# Patient Record
Sex: MALE | Race: WHITE | URBAN - METROPOLITAN AREA
[De-identification: names, ages, dates, MRNs, and addresses within clinical notes are randomized per-mention and may not be internally consistent; named-entity substitution may affect disease eponyms.]

---

## 2023-01-30 ENCOUNTER — OFFICE VISIT (OUTPATIENT)
Dept: INTERNAL MEDICINE CLINIC | Age: 43
End: 2023-01-30
Payer: COMMERCIAL

## 2023-01-30 VITALS
BODY MASS INDEX: 23.35 KG/M2 | DIASTOLIC BLOOD PRESSURE: 85 MMHG | WEIGHT: 176.2 LBS | TEMPERATURE: 98.9 F | OXYGEN SATURATION: 99 % | HEIGHT: 73 IN | SYSTOLIC BLOOD PRESSURE: 129 MMHG | HEART RATE: 71 BPM | RESPIRATION RATE: 18 BRPM

## 2023-01-30 DIAGNOSIS — Z87.898 HISTORY OF ALCOHOL USE: ICD-10-CM

## 2023-01-30 DIAGNOSIS — Z13.220 SCREENING FOR HYPERLIPIDEMIA: ICD-10-CM

## 2023-01-30 DIAGNOSIS — R68.82 LOW LIBIDO: ICD-10-CM

## 2023-01-30 DIAGNOSIS — F31.9 BIPOLAR DEPRESSION (HCC): ICD-10-CM

## 2023-01-30 DIAGNOSIS — Z76.89 ENCOUNTER TO ESTABLISH CARE: Primary | ICD-10-CM

## 2023-01-30 DIAGNOSIS — R53.83 FATIGUE, UNSPECIFIED TYPE: ICD-10-CM

## 2023-01-30 DIAGNOSIS — Z00.00 PHYSICAL EXAM, ANNUAL: ICD-10-CM

## 2023-01-30 PROCEDURE — 99203 OFFICE O/P NEW LOW 30 MIN: CPT | Performed by: INTERNAL MEDICINE

## 2023-01-30 RX ORDER — LAMOTRIGINE 200 MG/1
200 TABLET ORAL DAILY
COMMUNITY

## 2023-01-30 RX ORDER — HYDROXYZINE 50 MG/1
50 TABLET, FILM COATED ORAL AS NEEDED
COMMUNITY

## 2023-01-30 RX ORDER — QUETIAPINE FUMARATE 100 MG/1
100 TABLET, FILM COATED ORAL
COMMUNITY

## 2023-01-30 NOTE — PROGRESS NOTES
Chief Complaint   Patient presents with    Establish Care     Pt states he has been very tired and has been cold. He states his sex drive has gone down          Vitals:    01/30/23 1316   BP: 129/85   Pulse: 71   Resp: 18   Temp: 98.9 °F (37.2 °C)   TempSrc: Temporal   SpO2: 99%   Weight: 176 lb 3.2 oz (79.9 kg)   Height: 6' 1\" (1.854 m)   PainSc:   2   PainLoc: Foot       Current Outpatient Medications on File Prior to Visit   Medication Sig Dispense Refill    lurasidone (Latuda) 80 mg tab tablet Take 80 mg by mouth daily (with dinner). QUEtiapine (SEROqueL) 100 mg tablet Take 100 mg by mouth nightly. lamoTRIgine (LaMICtal) 200 mg tablet Take 200 mg by mouth daily. hydrOXYzine HCL (ATARAX) 50 mg tablet Take 50 mg by mouth as needed. No current facility-administered medications on file prior to visit. Health Maintenance Due   Topic    Hepatitis C Screening     Depression Screen     COVID-19 Vaccine (1)    Hepatitis B Vaccine (1 of 3 - Risk 3-dose series)    DTaP/Tdap/Td series (1 - Tdap)    Lipid Screen     Flu Vaccine (1)    Colorectal Cancer Screening Combo        1. \"Have you been to the ER, urgent care clinic since your last visit? Hospitalized since your last visit? \" No    2. \"Have you seen or consulted any other health care providers outside of the [address] [place] [address] since your last visit? \" Yes DR Rama Gregorio      3. For patients aged 39-70: Has the patient had a colonoscopy / FIT/ Cologuard? Yes - no Care Gap present,Dr Jennifer Li [facility]      If the patient is female:    4. For patients aged 41-77: Has the patient had a mammogram within the past 2 years? NA - based on age or sex      11. For patients aged 21-65: Has the patient had a pap smear?  NA - based on age or sex

## 2023-01-30 NOTE — PROGRESS NOTES
Akua Brown is a 37 y.o. male  Chief Complaint   Patient presents with    [facility] Care     Pt states he has been very tired and has been cold. He states his sex drive has gone down     Visit Vitals  /85 (BP 1 Location: Right upper arm, BP Patient Position: Sitting, BP Cuff Size: Adult long)   Pulse 71   Temp 98.9 °F (37.2 °C) (Temporal)   Resp 18   Ht 6' 1\" (1.854 m)   Wt 176 lb 3.2 oz (79.9 kg)   SpO2 99%   BMI 23.25 kg/m²          HPI  Mr.Scott Johnathon Simpson presents to clinic to [facility]. He has a history of significant alcohol use now sober for a year and his main concern today is low libido, weakness and fatigue over the last one year. He denies fever, chills. He is otherwise in good health, no concern for depression or STD. Denies chest pain, shortness of breath, melena or hematochezia. Diet: healthy balanced diet   Exercise: walking  regularly     Health maintenance: didn't get covid vaccine because he had the COVID virus infection. .  Past Medical History:   Diagnosis Date    Bipolar II disorder, hypomanic episode, with seasonal pattern ([address]     dx 1 year ago            ROS  Review of Systems   Constitutional:  Negative for chills and fever. HENT:  Negative for hearing loss and tinnitus. Respiratory:  Negative for cough, hemoptysis, sputum production and shortness of breath. Cardiovascular:  Negative for chest pain and palpitations. Gastrointestinal:  Negative for abdominal pain, heartburn and nausea. Genitourinary:  Negative for dysuria and urgency. Musculoskeletal:  Negative for myalgias and neck pain. Neurological:  Negative for dizziness, tingling and headaches. Psychiatric/Behavioral:  Negative for depression and suicidal ideas. EXAM  Physical Exam  Constitutional:       General: He is not in acute distress. Appearance: Normal appearance. He is not toxic-appearing. HENT:      Head: Normocephalic and atraumatic.       Nose: No congestion or rhinorrhea. Eyes:      General:         Right eye: No discharge. Extraocular Movements: Extraocular movements intact. Cardiovascular:      Rate and Rhythm: Normal rate and regular rhythm. Heart sounds: Normal heart sounds. No murmur heard. No gallop. Pulmonary:      Effort: Pulmonary effort is normal. No respiratory distress. Breath sounds: Normal breath sounds. No wheezing or rales. Abdominal:      General: There is no distension. Palpations: Abdomen is soft. There is no mass. Tenderness: There is no abdominal tenderness. There is no right CVA tenderness, left CVA tenderness, guarding or rebound. Hernia: No hernia is present. Musculoskeletal:         General: No swelling or deformity. Right lower leg: No edema. Skin:     Coloration: Skin is not jaundiced. Findings: No bruising or lesion. Neurological:      General: No focal deficit present. Mental Status: He is alert and oriented to person, place, and time. Motor: No weakness. Psychiatric:         Mood and Affect: Mood normal.       Health Maintenance Due   Topic Date Due    COVID-19 Vaccine (1) Never done    Hepatitis B Vaccine (1 of 3 - Risk 3-dose series) Never done    DTaP/Tdap/Td series (1 - Tdap) Never done    Lipid Screen  Never done    Flu Vaccine (1) Never done    Colorectal Cancer Screening Combo  01/21/2023       ASSESSMENT/PLAN    Diagnoses and all orders for this visit:    1. Encounter to establish care    2. Physical exam, annual  -     CBC WITH AUTOMATED DIFF; Future  -     METABOLIC PANEL, COMPREHENSIVE; Future  -     VITAMIN B1, WHOLE BLOOD; Future  -     VITAMIN B12 & FOLATE; Future  -     LIPID PANEL; Future  -     VITAMIN D, 25 HYDROXY; Future    3. Fatigue, unspecified type  -     THYROID CASCADE PROFILE; Future  -     CBC WITH AUTOMATED DIFF; Future  -     VITAMIN B1, WHOLE BLOOD; Future  -     VITAMIN B12 & FOLATE;  Future  -     VITAMIN D, 25 HYDROXY; Future  - TESTOSTERONE, FREE & TOTAL    4. Low libido  -     THYROID CASCADE PROFILE; Future  -     CBC WITH AUTOMATED DIFF; Future  -     VITAMIN B1, WHOLE BLOOD; Future  -     VITAMIN B12 & FOLATE; Future  -     VITAMIN D, 25 HYDROXY; Future  -     TESTOSTERONE, FREE & TOTAL    5. History of alcohol use  -     VITAMIN B12 & FOLATE; Future    6. Screening for hyperlipidemia  -     LIPID PANEL; Future    7.  Bipolar depression ([facility]  Comments:  f/u with psychiatry         Yeimi Aceves MD

## 2023-01-31 PROBLEM — Z87.898 HISTORY OF ALCOHOL USE: Status: ACTIVE | Noted: 2023-01-31

## 2023-01-31 PROBLEM — F31.9 BIPOLAR DEPRESSION (HCC): Status: ACTIVE | Noted: 2023-01-31

## 2023-05-12 SDOH — ECONOMIC STABILITY: FOOD INSECURITY: WITHIN THE PAST 12 MONTHS, THE FOOD YOU BOUGHT JUST DIDN'T LAST AND YOU DIDN'T HAVE MONEY TO GET MORE.: NEVER TRUE

## 2023-05-12 SDOH — ECONOMIC STABILITY: TRANSPORTATION INSECURITY
IN THE PAST 12 MONTHS, HAS LACK OF TRANSPORTATION KEPT YOU FROM MEETINGS, WORK, OR FROM GETTING THINGS NEEDED FOR DAILY LIVING?: NO

## 2023-05-12 SDOH — ECONOMIC STABILITY: FOOD INSECURITY: WITHIN THE PAST 12 MONTHS, YOU WORRIED THAT YOUR FOOD WOULD RUN OUT BEFORE YOU GOT MONEY TO BUY MORE.: NEVER TRUE

## 2023-05-12 SDOH — ECONOMIC STABILITY: INCOME INSECURITY: HOW HARD IS IT FOR YOU TO PAY FOR THE VERY BASICS LIKE FOOD, HOUSING, MEDICAL CARE, AND HEATING?: NOT VERY HARD

## 2023-05-12 SDOH — ECONOMIC STABILITY: HOUSING INSECURITY
IN THE LAST 12 MONTHS, WAS THERE A TIME WHEN YOU DID NOT HAVE A STEADY PLACE TO SLEEP OR SLEPT IN A SHELTER (INCLUDING NOW)?: NO

## 2023-05-15 ENCOUNTER — OFFICE VISIT (OUTPATIENT)
Age: 43
End: 2023-05-15
Payer: MEDICAID

## 2023-05-15 VITALS
SYSTOLIC BLOOD PRESSURE: 113 MMHG | RESPIRATION RATE: 18 BRPM | HEIGHT: 73 IN | TEMPERATURE: 97.9 F | WEIGHT: 176.2 LBS | OXYGEN SATURATION: 97 % | HEART RATE: 92 BPM | DIASTOLIC BLOOD PRESSURE: 83 MMHG | BODY MASS INDEX: 23.35 KG/M2

## 2023-05-15 DIAGNOSIS — E03.8 SUBCLINICAL HYPOTHYROIDISM: Primary | ICD-10-CM

## 2023-05-15 DIAGNOSIS — E03.8 SUBCLINICAL HYPOTHYROIDISM: ICD-10-CM

## 2023-05-15 PROCEDURE — 99213 OFFICE O/P EST LOW 20 MIN: CPT | Performed by: INTERNAL MEDICINE

## 2023-05-15 RX ORDER — QUETIAPINE FUMARATE 200 MG/1
TABLET, FILM COATED ORAL
COMMUNITY
Start: 2023-04-19

## 2023-05-15 ASSESSMENT — ENCOUNTER SYMPTOMS
SHORTNESS OF BREATH: 0
COUGH: 0

## 2023-05-15 NOTE — PROGRESS NOTES
Jorge Felipe is a 37 y.o. male  Chief Complaint   Patient presents with    Discuss Labs       Vitals:    05/15/23 0929   BP: 113/83   Pulse: 92   Resp: 18   Temp: 97.9 °F (36.6 °C)   SpO2: 97%          HPI  Mr.Scott Rebekah Hicks presents to follow up on his subclinical hypothyroidism. He continues to have loss of libido and fatigue. After starting levothyroxine, his energy during the day has improved. He is concerned about sleep problems and goes to psychiatry for Bipolar disorder and doesn't feel depressed at this time. Past Medical History:   Diagnosis Date    Bipolar II disorder, hypomanic episode, with seasonal pattern ([address]     dx 1 year ago            ROS  Review of Systems   Constitutional:  Positive for fatigue. Negative for fever. HENT:  Negative for congestion and dental problem. Respiratory:  Negative for cough and shortness of breath. Cardiovascular:  Negative for chest pain and palpitations. Endocrine: Negative for cold intolerance and heat intolerance. Genitourinary:  Negative for difficulty urinating. Neurological:  Negative for headaches. Psychiatric/Behavioral:  Negative for agitation and dysphoric mood. EXAM  Physical Exam  Vitals and nursing note reviewed. Cardiovascular:      Rate and Rhythm: Normal rate and regular rhythm. Pulses: Normal pulses. Heart sounds: Normal heart sounds. No murmur heard. Pulmonary:      Effort: Pulmonary effort is normal. No respiratory distress. Breath sounds: Normal breath sounds. Psychiatric:         Mood and Affect: Mood normal.         Behavior: Behavior normal.         Thought Content: Thought content normal.         Judgment: Judgment normal.       Health Maintenance Due   Topic Date Due    COVID-19 Vaccine (1) Never done    HIV screen  Never done    DTaP/Tdap/Td vaccine (1 - Tdap) Never done    Colorectal Cancer Screen  Never done       ASSESSMENT/PLAN    Eanelvis Quan was seen today for discuss labs.     Diagnoses and all

## 2023-05-15 NOTE — PROGRESS NOTES
Rm    Chief Complaint   Patient presents with    Discuss Labs        /83 (Site: Right Upper Arm, Position: Sitting, Cuff Size: Medium Adult)   Pulse 92   Temp 97.9 °F (36.6 °C) (Temporal)   Resp 18   Ht 6' 1\" (1.854 m)   Wt 176 lb 3.2 oz (79.9 kg)   SpO2 97%   BMI 23.25 kg/m²      1. Have you been to the ER, urgent care clinic since your last visit? Hospitalized since your last visit? no    2. Have you seen or consulted any other health care providers outside of the [address] [place] [address] since your last visit? Include any pap smears or colon screening. no    Health Maintenance Due   Topic Date Due    COVID-19 Vaccine (1) Never done    HIV screen  Never done    DTaP/Tdap/Td vaccine (1 - Tdap) Never done    Colorectal Cancer Screen  Never done        No flowsheet data found. No flowsheet data found. No flowsheet data found.

## 2023-05-19 LAB
T3 SERPL-MCNC: 111 NG/DL (ref 71–180)
T4 FREE SERPL-MCNC: 1.21 NG/DL (ref 0.82–1.77)
TSH SERPL DL<=0.005 MIU/L-ACNC: 4.76 UIU/ML (ref 0.45–4.5)

## 2023-05-25 ENCOUNTER — PATIENT MESSAGE (OUTPATIENT)
Age: 43
End: 2023-05-25

## 2023-05-26 RX ORDER — LEVOTHYROXINE SODIUM 0.03 MG/1
37.5 TABLET ORAL DAILY
Qty: 135 TABLET | Refills: 0 | Status: SHIPPED | OUTPATIENT
Start: 2023-05-26 | End: 2023-08-24

## 2023-09-14 RX ORDER — LEVOTHYROXINE SODIUM 0.03 MG/1
37.5 TABLET ORAL DAILY
Qty: 135 TABLET | Refills: 0 | Status: SHIPPED | OUTPATIENT
Start: 2023-09-14 | End: 2023-12-13

## 2023-10-20 ENCOUNTER — OFFICE VISIT (OUTPATIENT)
Age: 43
End: 2023-10-20
Payer: MEDICAID

## 2023-10-20 VITALS
BODY MASS INDEX: 23.59 KG/M2 | TEMPERATURE: 98.6 F | OXYGEN SATURATION: 98 % | DIASTOLIC BLOOD PRESSURE: 82 MMHG | WEIGHT: 178 LBS | HEIGHT: 73 IN | HEART RATE: 76 BPM | RESPIRATION RATE: 16 BRPM | SYSTOLIC BLOOD PRESSURE: 128 MMHG

## 2023-10-20 DIAGNOSIS — Z00.00 ENCOUNTER FOR WELL ADULT EXAM WITHOUT ABNORMAL FINDINGS: Primary | ICD-10-CM

## 2023-10-20 DIAGNOSIS — E03.8 SUBCLINICAL HYPOTHYROIDISM: ICD-10-CM

## 2023-10-20 DIAGNOSIS — Z12.11 SCREEN FOR COLON CANCER: ICD-10-CM

## 2023-10-20 PROCEDURE — 99396 PREV VISIT EST AGE 40-64: CPT | Performed by: INTERNAL MEDICINE

## 2023-10-20 RX ORDER — LURASIDONE HYDROCHLORIDE 20 MG/1
20 TABLET, FILM COATED ORAL
COMMUNITY

## 2023-10-20 ASSESSMENT — PATIENT HEALTH QUESTIONNAIRE - PHQ9
SUM OF ALL RESPONSES TO PHQ QUESTIONS 1-9: 0
SUM OF ALL RESPONSES TO PHQ9 QUESTIONS 1 & 2: 0
2. FEELING DOWN, DEPRESSED OR HOPELESS: 0
SUM OF ALL RESPONSES TO PHQ QUESTIONS 1-9: 0
1. LITTLE INTEREST OR PLEASURE IN DOING THINGS: 0

## 2023-10-23 ASSESSMENT — ENCOUNTER SYMPTOMS
VOMITING: 0
COUGH: 0
DIARRHEA: 0
SORE THROAT: 0
SINUS PRESSURE: 0
EYE PAIN: 0
CONSTIPATION: 0
NAUSEA: 0
ABDOMINAL PAIN: 0
BACK PAIN: 0
SHORTNESS OF BREATH: 0
BLOOD IN STOOL: 0
SINUS PAIN: 0

## 2023-11-04 LAB — NONINV COLON CA DNA+OCC BLD SCRN STL QL: NEGATIVE

## 2023-12-13 RX ORDER — LEVOTHYROXINE SODIUM 0.03 MG/1
37.5 TABLET ORAL DAILY
Qty: 135 TABLET | Refills: 0 | Status: SHIPPED | OUTPATIENT
Start: 2023-12-13 | End: 2024-03-12

## 2024-04-12 RX ORDER — LEVOTHYROXINE SODIUM 0.03 MG/1
37.5 TABLET ORAL DAILY
Qty: 135 TABLET | Refills: 0 | Status: SHIPPED | OUTPATIENT
Start: 2024-04-12 | End: 2024-07-11

## 2024-04-12 NOTE — TELEPHONE ENCOUNTER
Refill request received from [facility] for   Requested Prescriptions     Pending Prescriptions Disp Refills    levothyroxine (SYNTHROID) 25 MCG tablet 45 tablet 0     Sig: Take 1.5 tablets by mouth daily     Last office visit: 10/20/2023   Next office visit: Visit date not found     Routed to Dr Sada Tran for review.

## 2024-07-29 RX ORDER — LEVOTHYROXINE SODIUM 0.03 MG/1
37.5 TABLET ORAL DAILY
Qty: 135 TABLET | Refills: 0 | Status: SHIPPED | OUTPATIENT
Start: 2024-07-29 | End: 2024-10-27

## 2024-10-21 RX ORDER — LEVOTHYROXINE SODIUM 25 UG/1
37.5 TABLET ORAL DAILY
Qty: 135 TABLET | Refills: 0 | OUTPATIENT
Start: 2024-10-21 | End: 2025-01-19

## 2024-11-04 ENCOUNTER — OFFICE VISIT (OUTPATIENT)
Age: 44
End: 2024-11-04
Payer: COMMERCIAL

## 2024-11-04 VITALS
HEIGHT: 71 IN | HEART RATE: 62 BPM | WEIGHT: 172 LBS | TEMPERATURE: 98.2 F | OXYGEN SATURATION: 98 % | BODY MASS INDEX: 24.08 KG/M2 | DIASTOLIC BLOOD PRESSURE: 75 MMHG | SYSTOLIC BLOOD PRESSURE: 130 MMHG | RESPIRATION RATE: 18 BRPM

## 2024-11-04 DIAGNOSIS — E03.8 SUBCLINICAL HYPOTHYROIDISM: ICD-10-CM

## 2024-11-04 DIAGNOSIS — Z00.00 ENCOUNTER FOR WELL ADULT EXAM WITHOUT ABNORMAL FINDINGS: Primary | ICD-10-CM

## 2024-11-04 DIAGNOSIS — E78.00 PURE HYPERCHOLESTEROLEMIA: ICD-10-CM

## 2024-11-04 PROCEDURE — 99396 PREV VISIT EST AGE 40-64: CPT | Performed by: INTERNAL MEDICINE

## 2024-11-04 RX ORDER — LEVOTHYROXINE SODIUM 25 UG/1
37.5 TABLET ORAL DAILY
Qty: 135 TABLET | Refills: 0 | Status: SHIPPED | OUTPATIENT
Start: 2024-11-04 | End: 2024-11-04

## 2024-11-04 RX ORDER — LEVOTHYROXINE SODIUM 25 UG/1
37.5 TABLET ORAL DAILY
Qty: 135 TABLET | Refills: 0 | Status: SHIPPED | OUTPATIENT
Start: 2024-11-04 | End: 2025-02-02

## 2024-11-04 SDOH — ECONOMIC STABILITY: FOOD INSECURITY: WITHIN THE PAST 12 MONTHS, YOU WORRIED THAT YOUR FOOD WOULD RUN OUT BEFORE YOU GOT MONEY TO BUY MORE.: NEVER TRUE

## 2024-11-04 SDOH — ECONOMIC STABILITY: FOOD INSECURITY: WITHIN THE PAST 12 MONTHS, THE FOOD YOU BOUGHT JUST DIDN'T LAST AND YOU DIDN'T HAVE MONEY TO GET MORE.: NEVER TRUE

## 2024-11-04 SDOH — ECONOMIC STABILITY: INCOME INSECURITY: HOW HARD IS IT FOR YOU TO PAY FOR THE VERY BASICS LIKE FOOD, HOUSING, MEDICAL CARE, AND HEATING?: NOT HARD AT ALL

## 2024-11-04 ASSESSMENT — PATIENT HEALTH QUESTIONNAIRE - PHQ9

## 2024-11-04 ASSESSMENT — ENCOUNTER SYMPTOMS
COUGH: 0
SHORTNESS OF BREATH: 0

## 2024-11-04 NOTE — PROGRESS NOTES
I have reviewed all needed documentation in preparation for visit. Verified patient by name and date of birth  Chief Complaint   Patient presents with    Annual Exam       Vitals:    11/04/24 0822   BP: 130/75   Site: Left Upper Arm   Position: Sitting   Cuff Size: Large Adult   Pulse: 62   Resp: 18   Temp: 98.2 °F (36.8 °C)   TempSrc: Temporal   SpO2: 98%   Weight: 78 kg (172 lb)  Comment: without shoes   Height: 1.803 m (5' 11\")  Comment: w/o shoes       Health Maintenance Due   Topic Date Due    Hepatitis B vaccine (1 of 3 - 19+ 3-dose series) Never done    DTaP/Tdap/Td vaccine (1 - Tdap) Never done    Flu vaccine (1) Never done    COVID-19 Vaccine (1 - 2023-24 season) Never done    Depression Monitoring  10/20/2024     \"Have you been to the ER, urgent care clinic since your last visit?  Hospitalized since your last visit?\"    NO    “Have you seen or consulted any other health care providers outside of [facility] since your last visit?”    NO          Click Here for Release of Records Request         Miguel TOSCANO

## 2024-11-04 NOTE — PROGRESS NOTES
Well Adult Note  Name: Erik Sanchez Today’s Date: 2024   MRN: [id number] Sex: Male   Age: 44 y.o. Ethnicity: Non- / Non    : 1980 Race: White (non-)      Erik Sanchez is here for a well adult exam.       Subjective   History:  Mr.Scott Sanchez presents to the clinic for a physical examination with no acute concerns. He has been sober from alcohol for over two years and is currently tapering off his medication for bipolar depression, having completely discontinued it. He reports feeling great and has started working full time. Additionally, he began exercising two weeks ago, and his wife ensures he maintains a healthy diet that includes vegetables. He does not experience lower urinary tract symptoms, chest pain, shortness of breath, melena, or hematochezia, and he has no aches or pains. The patient had a colonoscopy approximately six years ago and a Cologuard test one year ago, both of which were normal      Review of Systems   Constitutional:  Negative for fever.   Respiratory:  Negative for cough and shortness of breath.    Cardiovascular:  Negative for chest pain and palpitations.   Neurological:  Negative for headaches.   Psychiatric/Behavioral:  Negative for dysphoric mood.        No Known Allergies  Prior to Visit Medications    Medication Sig Taking? Authorizing Provider   levothyroxine (SYNTHROID) 25 MCG tablet Take 1.5 tablets by mouth daily Yes Sada Tran MD     Past Medical History:   Diagnosis Date    Bipolar II disorder, hypomanic episode, with seasonal pattern (HCC)     dx 1 year ago    GERD (gastroesophageal reflux disease)     No longer an issue    Hypothyroidism     Substance abuse (HCC)     Sober since 2022     History reviewed. No pertinent surgical history.  Family History   Problem Relation Age of Onset    Hypertension Mother     Alcohol Abuse Mother     Depression Mother     High Blood Pressure Mother     Hypertension Father     High

## 2024-11-08 DIAGNOSIS — Z00.00 ENCOUNTER FOR WELL ADULT EXAM WITHOUT ABNORMAL FINDINGS: ICD-10-CM

## 2024-11-08 DIAGNOSIS — E03.8 SUBCLINICAL HYPOTHYROIDISM: ICD-10-CM

## 2024-11-08 DIAGNOSIS — E78.00 PURE HYPERCHOLESTEROLEMIA: ICD-10-CM

## 2024-11-08 LAB
ALBUMIN SERPL-MCNC: 4 G/DL (ref 3.5–5)
ALBUMIN/GLOB SERPL: 1.4 (ref 1.1–2.2)
ALP SERPL-CCNC: 56 U/L (ref 45–117)
ALT SERPL-CCNC: 23 U/L (ref 12–78)
ANION GAP SERPL CALC-SCNC: 1 MMOL/L (ref 2–12)
AST SERPL-CCNC: 9 U/L (ref 15–37)
BASOPHILS # BLD: 0 K/UL (ref 0–0.1)
BASOPHILS NFR BLD: 0 % (ref 0–1)
BILIRUB SERPL-MCNC: 0.7 MG/DL (ref 0.2–1)
BUN SERPL-MCNC: 13 MG/DL (ref 6–20)
BUN/CREAT SERPL: 14 (ref 12–20)
CALCIUM SERPL-MCNC: 9.1 MG/DL (ref 8.5–10.1)
CHLORIDE SERPL-SCNC: 107 MMOL/L (ref 97–108)
CHOLEST SERPL-MCNC: 212 MG/DL
CO2 SERPL-SCNC: 32 MMOL/L (ref 21–32)
CREAT SERPL-MCNC: 0.9 MG/DL (ref 0.7–1.3)
DIFFERENTIAL METHOD BLD: ABNORMAL
EOSINOPHIL # BLD: 0.1 K/UL (ref 0–0.4)
EOSINOPHIL NFR BLD: 1 % (ref 0–7)
ERYTHROCYTE [DISTWIDTH] IN BLOOD BY AUTOMATED COUNT: 12 % (ref 11.5–14.5)
EST. AVERAGE GLUCOSE BLD GHB EST-MCNC: 94 MG/DL
GLOBULIN SER CALC-MCNC: 2.9 G/DL (ref 2–4)
GLUCOSE SERPL-MCNC: 95 MG/DL (ref 65–100)
HBA1C MFR BLD: 4.9 % (ref 4–5.6)
HCT VFR BLD AUTO: 44.7 % (ref 36.6–50.3)
HDLC SERPL-MCNC: 44 MG/DL
HDLC SERPL: 4.8 (ref 0–5)
HGB BLD-MCNC: 15.1 G/DL (ref 12.1–17)
IMM GRANULOCYTES # BLD AUTO: 0 K/UL (ref 0–0.04)
IMM GRANULOCYTES NFR BLD AUTO: 0 % (ref 0–0.5)
LDLC SERPL CALC-MCNC: 145.6 MG/DL (ref 0–100)
LYMPHOCYTES # BLD: 1.2 K/UL (ref 0.8–3.5)
LYMPHOCYTES NFR BLD: 16 % (ref 12–49)
MCH RBC QN AUTO: 29.4 PG (ref 26–34)
MCHC RBC AUTO-ENTMCNC: 33.8 G/DL (ref 30–36.5)
MCV RBC AUTO: 87.1 FL (ref 80–99)
MONOCYTES # BLD: 0.5 K/UL (ref 0–1)
MONOCYTES NFR BLD: 6 % (ref 5–13)
NEUTS SEG # BLD: 5.6 K/UL (ref 1.8–8)
NEUTS SEG NFR BLD: 77 % (ref 32–75)
NRBC # BLD: 0 K/UL (ref 0–0.01)
NRBC BLD-RTO: 0 PER 100 WBC
PLATELET # BLD AUTO: 142 K/UL (ref 150–400)
PMV BLD AUTO: 9.9 FL (ref 8.9–12.9)
POTASSIUM SERPL-SCNC: 3.9 MMOL/L (ref 3.5–5.1)
PROT SERPL-MCNC: 6.9 G/DL (ref 6.4–8.2)
RBC # BLD AUTO: 5.13 M/UL (ref 4.1–5.7)
SODIUM SERPL-SCNC: 140 MMOL/L (ref 136–145)
T4 FREE SERPL-MCNC: 1.2 NG/DL (ref 0.8–1.5)
TRIGL SERPL-MCNC: 112 MG/DL
TSH SERPL DL<=0.05 MIU/L-ACNC: 2.47 UIU/ML (ref 0.36–3.74)
VLDLC SERPL CALC-MCNC: 22.4 MG/DL
WBC # BLD AUTO: 7.4 K/UL (ref 4.1–11.1)

## 2025-01-02 ENCOUNTER — OFFICE VISIT (OUTPATIENT)
Age: 45
End: 2025-01-02
Payer: COMMERCIAL

## 2025-01-02 VITALS
HEART RATE: 75 BPM | SYSTOLIC BLOOD PRESSURE: 113 MMHG | TEMPERATURE: 98.4 F | BODY MASS INDEX: 22.66 KG/M2 | WEIGHT: 171 LBS | RESPIRATION RATE: 18 BRPM | HEIGHT: 73 IN | DIASTOLIC BLOOD PRESSURE: 76 MMHG | OXYGEN SATURATION: 97 %

## 2025-01-02 DIAGNOSIS — J20.9 ACUTE BRONCHITIS, UNSPECIFIED ORGANISM: Primary | ICD-10-CM

## 2025-01-02 DIAGNOSIS — J01.00 ACUTE NON-RECURRENT MAXILLARY SINUSITIS: ICD-10-CM

## 2025-01-02 PROCEDURE — 99214 OFFICE O/P EST MOD 30 MIN: CPT | Performed by: INTERNAL MEDICINE

## 2025-01-02 RX ORDER — ALBUTEROL SULFATE 90 UG/1
2 INHALANT RESPIRATORY (INHALATION) EVERY 6 HOURS PRN
Qty: 1 EACH | Refills: 0 | Status: SHIPPED | OUTPATIENT
Start: 2025-01-02

## 2025-01-02 ASSESSMENT — PATIENT HEALTH QUESTIONNAIRE - PHQ9
1. LITTLE INTEREST OR PLEASURE IN DOING THINGS: NOT AT ALL
SUM OF ALL RESPONSES TO PHQ9 QUESTIONS 1 & 2: 0
5. POOR APPETITE OR OVEREATING: NOT AT ALL
4. FEELING TIRED OR HAVING LITTLE ENERGY: NOT AT ALL
3. TROUBLE FALLING OR STAYING ASLEEP: NOT AT ALL
SUM OF ALL RESPONSES TO PHQ QUESTIONS 1-9: 0
9. THOUGHTS THAT YOU WOULD BE BETTER OFF DEAD, OR OF HURTING YOURSELF: NOT AT ALL
10. IF YOU CHECKED OFF ANY PROBLEMS, HOW DIFFICULT HAVE THESE PROBLEMS MADE IT FOR YOU TO DO YOUR WORK, TAKE CARE OF THINGS AT HOME, OR GET ALONG WITH OTHER PEOPLE: NOT DIFFICULT AT ALL
7. TROUBLE CONCENTRATING ON THINGS, SUCH AS READING THE NEWSPAPER OR WATCHING TELEVISION: NOT AT ALL
6. FEELING BAD ABOUT YOURSELF - OR THAT YOU ARE A FAILURE OR HAVE LET YOURSELF OR YOUR FAMILY DOWN: NOT AT ALL
2. FEELING DOWN, DEPRESSED OR HOPELESS: NOT AT ALL
8. MOVING OR SPEAKING SO SLOWLY THAT OTHER PEOPLE COULD HAVE NOTICED. OR THE OPPOSITE, BEING SO FIGETY OR RESTLESS THAT YOU HAVE BEEN MOVING AROUND A LOT MORE THAN USUAL: NOT AT ALL
SUM OF ALL RESPONSES TO PHQ QUESTIONS 1-9: 0

## 2025-01-02 ASSESSMENT — ENCOUNTER SYMPTOMS
SINUS PAIN: 1
COUGH: 1
RHINORRHEA: 1
EYE PAIN: 0
SHORTNESS OF BREATH: 0
TROUBLE SWALLOWING: 0
SINUS PRESSURE: 1

## 2025-01-02 NOTE — PROGRESS NOTES
I have reviewed all needed documentation in preparation for visit. Verified patient by name and date of birth  Chief Complaint   Patient presents with    Cold Symptoms     Sxs Chest Congestion , Phlegm dark brown in color at night in the day time looks yellow . Started on Saturday        There were no vitals filed for this visit.    Health Maintenance Due   Topic Date Due    Hepatitis B vaccine (1 of 3 - 19+ 3-dose series) Never done    DTaP/Tdap/Td vaccine (1 - Tdap) Never done    Flu vaccine (1) Never done    COVID-19 Vaccine (1 - 2023-24 season) Never done     \"Have you been to the ER, urgent care clinic since your last visit?  Hospitalized since your last visit?\"    NO    “Have you seen or consulted any other health care providers outside of [facility] since your last visit?”    NO          Click Here for Release of Records Request         Miguel TOSCANO  
     Mood and Affect: Mood normal.         Behavior: Behavior normal.         Thought Content: Thought content normal.         Judgment: Judgment normal.         Health Maintenance Due   Topic Date Due    Hepatitis B vaccine (1 of 3 - 19+ 3-dose series) Never done    DTaP/Tdap/Td vaccine (1 - Tdap) Never done    Flu vaccine (1) Never done    COVID-19 Vaccine (1 - 2023-24 season) Never done       ASSESSMENT/PLAN    Erik was seen today for cold symptoms.    Diagnoses and all orders for this visit:    Acute bronchitis, unspecified organism  -     amoxicillin-clavulanate (AUGMENTIN) 875-125 MG per tablet; Take 1 tablet by mouth 2 times daily for 7 days  -     albuterol sulfate HFA (PROVENTIL;VENTOLIN;PROAIR) 108 (90 Base) MCG/ACT inhaler; Inhale 2 puffs into the lungs every 6 hours as needed for Shortness of Breath    Acute non-recurrent maxillary sinusitis  -     amoxicillin-clavulanate (AUGMENTIN) 875-125 MG per tablet; Take 1 tablet by mouth 2 times daily for 7 days  -     albuterol sulfate HFA (PROVENTIL;VENTOLIN;PROAIR) 108 (90 Base) MCG/ACT inhaler; Inhale 2 puffs into the lungs every 6 hours as needed for Shortness of Breath      Use saline irrigation   OTC cough medication for cough   Use albuterol inhaler   If symptoms doesn't feel better, return for further evaluation       Sada Tran MD

## 2025-01-12 SDOH — ECONOMIC STABILITY: INCOME INSECURITY: IN THE LAST 12 MONTHS, WAS THERE A TIME WHEN YOU WERE NOT ABLE TO PAY THE MORTGAGE OR RENT ON TIME?: NO

## 2025-01-12 SDOH — ECONOMIC STABILITY: FOOD INSECURITY: WITHIN THE PAST 12 MONTHS, YOU WORRIED THAT YOUR FOOD WOULD RUN OUT BEFORE YOU GOT MONEY TO BUY MORE.: NEVER TRUE

## 2025-01-12 SDOH — ECONOMIC STABILITY: FOOD INSECURITY: WITHIN THE PAST 12 MONTHS, THE FOOD YOU BOUGHT JUST DIDN'T LAST AND YOU DIDN'T HAVE MONEY TO GET MORE.: NEVER TRUE

## 2025-01-12 SDOH — ECONOMIC STABILITY: TRANSPORTATION INSECURITY
IN THE PAST 12 MONTHS, HAS THE LACK OF TRANSPORTATION KEPT YOU FROM MEDICAL APPOINTMENTS OR FROM GETTING MEDICATIONS?: NO

## 2025-01-13 ENCOUNTER — HOSPITAL ENCOUNTER (OUTPATIENT)
Facility: HOSPITAL | Age: 45
Discharge: HOME | End: 2025-01-16
Attending: INTERNAL MEDICINE
Payer: COMMERCIAL

## 2025-01-13 ENCOUNTER — OFFICE VISIT (OUTPATIENT)
Age: 45
End: 2025-01-13
Payer: COMMERCIAL

## 2025-01-13 VITALS
DIASTOLIC BLOOD PRESSURE: 65 MMHG | WEIGHT: 172 LBS | HEIGHT: 73 IN | RESPIRATION RATE: 18 BRPM | BODY MASS INDEX: 22.8 KG/M2 | SYSTOLIC BLOOD PRESSURE: 99 MMHG | TEMPERATURE: 98.9 F | HEART RATE: 75 BPM | OXYGEN SATURATION: 97 %

## 2025-01-13 DIAGNOSIS — J40 BRONCHITIS: ICD-10-CM

## 2025-01-13 DIAGNOSIS — J40 BRONCHITIS: Primary | ICD-10-CM

## 2025-01-13 PROCEDURE — 71046 X-RAY EXAM CHEST 2 VIEWS: CPT

## 2025-01-13 PROCEDURE — 99213 OFFICE O/P EST LOW 20 MIN: CPT | Performed by: INTERNAL MEDICINE

## 2025-01-13 RX ORDER — AZITHROMYCIN 250 MG/1
TABLET, FILM COATED ORAL
Qty: 6 TABLET | Refills: 0 | Status: SHIPPED | OUTPATIENT
Start: 2025-01-13 | End: 2025-01-23

## 2025-01-13 ASSESSMENT — ENCOUNTER SYMPTOMS
SINUS PAIN: 0
COUGH: 1
TROUBLE SWALLOWING: 0
EYE PAIN: 0
RHINORRHEA: 1
SHORTNESS OF BREATH: 0

## 2025-01-13 NOTE — PROGRESS NOTES
I have reviewed all needed documentation in preparation for visit. Verified patient by name and date of birth  Chief Complaint   Patient presents with    Congestion     Tender sore Throat , Phlegm is brown in Coloration , pt completed course of antibiotics on Thursday pt felt better then started back up on Saturday        There were no vitals filed for this visit.    Health Maintenance Due   Topic Date Due    Hepatitis B vaccine (1 of 3 - 19+ 3-dose series) Never done    DTaP/Tdap/Td vaccine (1 - Tdap) Never done    Flu vaccine (1) Never done    COVID-19 Vaccine (1 - 2023-24 season) Never done     \"Have you been to the ER, urgent care clinic since your last visit?  Hospitalized since your last visit?\"    NO    “Have you seen or consulted any other health care providers outside of [facility] since your last visit?”    NO          Click Here for Release of Records Request         Miguel Olivera [facility]

## 2025-01-13 NOTE — PROGRESS NOTES
Erik Sanchez is a 44 y.o. male  Chief Complaint   Patient presents with    Congestion     Tender sore Throat , Phlegm is brown in Coloration , pt completed course of antibiotics on Thursday pt felt better then started back up on Saturday        Vitals:    01/13/25 1412   BP: 99/65   Pulse: 75   Resp: 18   Temp: 98.9 °F (37.2 °C)   SpO2: 97%          HPI  Mr.Scott Sanchez presents with a sore throat, drainage, cough, congestion, and chest discomfort. The symptoms initially improved after starting antibiotics, but they returned after completing the antibiotic course. Given the persistence of symptoms despite treatment for a sinus infection, a chest X-ray will be ordered for further evaluation.    .  Past Medical History:   Diagnosis Date    Bipolar II disorder, hypomanic episode, with seasonal pattern ([facility])     dx 1 year ago    GERD (gastroesophageal reflux disease) 2016    No longer an issue    Hypothyroidism 2023    Substance abuse ([facility])     Sober since January 2022            ROS  Review of Systems   Constitutional:  Negative for fever.   HENT:  Positive for congestion, postnasal drip and rhinorrhea. Negative for ear pain, sinus pain and trouble swallowing.    Eyes:  Negative for pain.   Respiratory:  Positive for cough. Negative for shortness of breath.    Cardiovascular:  Negative for chest pain.   Skin:  Negative for rash.   Allergic/Immunologic: Negative for environmental allergies.   Neurological:  Positive for headaches. Negative for dizziness.   Hematological:  Negative for adenopathy.           EXAM  Physical Exam  Vitals reviewed.   Constitutional:       Appearance: Normal appearance.   HENT:      Head: Normocephalic and atraumatic.   Cardiovascular:      Rate and Rhythm: Normal rate and regular rhythm.      Pulses: Normal pulses.      Heart sounds: Normal heart sounds. No murmur heard.  Pulmonary:      Effort: Pulmonary effort is normal. No respiratory distress.      Breath sounds: Normal breath sounds.

## 2025-01-23 ENCOUNTER — TELEMEDICINE (OUTPATIENT)
Age: 45
End: 2025-01-23
Payer: COMMERCIAL

## 2025-01-23 ENCOUNTER — TELEPHONE (OUTPATIENT)
Age: 45
End: 2025-01-23

## 2025-01-23 DIAGNOSIS — R05.8 POST-VIRAL COUGH SYNDROME: ICD-10-CM

## 2025-01-23 DIAGNOSIS — J04.0 LARYNGITIS: Primary | ICD-10-CM

## 2025-01-23 PROCEDURE — 99213 OFFICE O/P EST LOW 20 MIN: CPT | Performed by: INTERNAL MEDICINE

## 2025-01-23 RX ORDER — METHYLPREDNISOLONE 4 MG/1
TABLET ORAL
Qty: 1 KIT | Refills: 0 | Status: SHIPPED | OUTPATIENT
Start: 2025-01-23 | End: 2025-01-29

## 2025-01-23 ASSESSMENT — ENCOUNTER SYMPTOMS
SORE THROAT: 1
SINUS PAIN: 0
TROUBLE SWALLOWING: 0
RHINORRHEA: 0
SHORTNESS OF BREATH: 0
EYE PAIN: 0
COUGH: 1

## 2025-01-23 NOTE — PROGRESS NOTES
I have reviewed all needed documentation in preparation for visit. Verified patient by name and date of birth  Chief Complaint   Patient presents with    Pharyngitis       There were no vitals filed for this visit.    Health Maintenance Due   Topic Date Due    Hepatitis B vaccine (1 of 3 - 19+ 3-dose series) Never done    DTaP/Tdap/Td vaccine (1 - Tdap) Never done    Flu vaccine (1) Never done    COVID-19 Vaccine (1 - 2023-24 season) Never done     \"Have you been to the ER, urgent care clinic since your last visit?  Hospitalized since your last visit?\"    NO    “Have you seen or consulted any other health care providers outside of [facility] since your last visit?”    NO          Click Here for Release of Records Request         Miguel Olivera [facility]  
[id number]  Provider was located at Facility (Appt Dept): [address] [address]  Confirm you are appropriately licensed, registered, or certified to deliver care in the state where the patient is located as indicated above. If you are not or unsure, please re-schedule the visit: Yes, I confirm.       Total time spent on this encounter: Not billed by time    --Sada Tran MD on 1/23/2025 at 8:30 AM    An electronic signature was used to authenticate this note.

## 2025-02-10 DIAGNOSIS — E03.8 SUBCLINICAL HYPOTHYROIDISM: ICD-10-CM

## 2025-02-10 RX ORDER — LEVOTHYROXINE SODIUM 25 UG/1
37.5 TABLET ORAL DAILY
Qty: 135 TABLET | Refills: 0 | Status: SHIPPED | OUTPATIENT
Start: 2025-02-10 | End: 2025-05-11

## 2025-05-12 DIAGNOSIS — E03.8 SUBCLINICAL HYPOTHYROIDISM: ICD-10-CM

## 2025-05-12 RX ORDER — LEVOTHYROXINE SODIUM 25 UG/1
37.5 TABLET ORAL DAILY
Qty: 135 TABLET | Refills: 0 | Status: SHIPPED | OUTPATIENT
Start: 2025-05-12 | End: 2025-08-10

## 2025-05-12 NOTE — PERIOP NOTE
PAT  PHONE INTERVIEW DONE WITH PATIENT. HE WAS GIVEN INSTRUCTIONS ON MEDICATIONS, USE OF CHG SOAP, AND CLEAR LIQUIDS ONLY AFTER MIDNIGHT, 12 OZ AT A TIME EVERY 4 HOURS UNTIL 1 HOUR PRIOR TO ARRIVAL. PATIENT GIVEN OPPORTUNITY FOR QUESTIONS. HE VOICES UNDERSTANDING.

## 2025-05-13 ENCOUNTER — ANESTHESIA EVENT (OUTPATIENT)
Facility: HOSPITAL | Age: 45
End: 2025-05-13
Payer: COMMERCIAL

## 2025-05-13 ENCOUNTER — HOSPITAL ENCOUNTER (OUTPATIENT)
Facility: HOSPITAL | Age: 45
Discharge: HOME | End: 2025-05-16
Attending: OTOLARYNGOLOGY
Payer: COMMERCIAL

## 2025-05-13 DIAGNOSIS — C09.0 MALIGNANT NEOPLASM OF TONSILLAR FOSSA (HCC): ICD-10-CM

## 2025-05-13 PROCEDURE — 6360000004 HC RX CONTRAST MEDICATION: Performed by: OTOLARYNGOLOGY

## 2025-05-13 PROCEDURE — 70491 CT SOFT TISSUE NECK W/DYE: CPT

## 2025-05-13 RX ORDER — IOPAMIDOL 612 MG/ML
100 INJECTION, SOLUTION INTRAVASCULAR
Status: COMPLETED | OUTPATIENT
Start: 2025-05-13 | End: 2025-05-13

## 2025-05-13 RX ADMIN — IOPAMIDOL 100 ML: 612 INJECTION, SOLUTION INTRAVENOUS at 10:07

## 2025-05-14 ENCOUNTER — HOSPITAL ENCOUNTER (INPATIENT)
Facility: HOSPITAL | Age: 45
LOS: 3 days | Discharge: HOME HEALTH | DRG: 097 | End: 2025-05-17
Attending: OTOLARYNGOLOGY | Admitting: OTOLARYNGOLOGY
Payer: COMMERCIAL

## 2025-05-14 ENCOUNTER — HOSPITAL ENCOUNTER (OUTPATIENT)
Facility: HOSPITAL | Age: 45
Discharge: HOME | End: 2025-05-17

## 2025-05-14 ENCOUNTER — TELEPHONE (OUTPATIENT)
Age: 45
End: 2025-05-14

## 2025-05-14 ENCOUNTER — ANESTHESIA (OUTPATIENT)
Facility: HOSPITAL | Age: 45
End: 2025-05-14
Payer: COMMERCIAL

## 2025-05-14 DIAGNOSIS — C09.9 TONSILLAR CANCER (HCC): Primary | ICD-10-CM

## 2025-05-14 DIAGNOSIS — C09.9 SQUAMOUS CELL CARCINOMA OF LEFT TONSIL (HCC): Primary | ICD-10-CM

## 2025-05-14 PROCEDURE — 92610 EVALUATE SWALLOWING FUNCTION: CPT

## 2025-05-14 PROCEDURE — 3700000000 HC ANESTHESIA ATTENDED CARE: Performed by: OTOLARYNGOLOGY

## 2025-05-14 PROCEDURE — 6360000002 HC RX W HCPCS: Performed by: OTOLARYNGOLOGY

## 2025-05-14 PROCEDURE — 6370000000 HC RX 637 (ALT 250 FOR IP): Performed by: OTOLARYNGOLOGY

## 2025-05-14 PROCEDURE — 88342 IMHCHEM/IMCYTCHM 1ST ANTB: CPT

## 2025-05-14 PROCEDURE — 0B110F4 BYPASS TRACHEA TO CUTANEOUS WITH TRACHEOSTOMY DEVICE, OPEN APPROACH: ICD-10-PCS | Performed by: OTOLARYNGOLOGY

## 2025-05-14 PROCEDURE — 2580000003 HC RX 258: Performed by: NURSE ANESTHETIST, CERTIFIED REGISTERED

## 2025-05-14 PROCEDURE — 6360000002 HC RX W HCPCS: Performed by: STUDENT IN AN ORGANIZED HEALTH CARE EDUCATION/TRAINING PROGRAM

## 2025-05-14 PROCEDURE — 92597 ORAL SPEECH DEVICE EVAL: CPT

## 2025-05-14 PROCEDURE — 3700000001 HC ADD 15 MINUTES (ANESTHESIA): Performed by: OTOLARYNGOLOGY

## 2025-05-14 PROCEDURE — 2500000003 HC RX 250 WO HCPCS: Performed by: NURSE ANESTHETIST, CERTIFIED REGISTERED

## 2025-05-14 PROCEDURE — 2500000003 HC RX 250 WO HCPCS: Performed by: OTOLARYNGOLOGY

## 2025-05-14 PROCEDURE — 88305 TISSUE EXAM BY PATHOLOGIST: CPT

## 2025-05-14 PROCEDURE — 6360000002 HC RX W HCPCS: Performed by: NURSE ANESTHETIST, CERTIFIED REGISTERED

## 2025-05-14 PROCEDURE — 0CBM8ZX EXCISION OF PHARYNX, VIA NATURAL OR ARTIFICIAL OPENING ENDOSCOPIC, DIAGNOSTIC: ICD-10-PCS | Performed by: OTOLARYNGOLOGY

## 2025-05-14 PROCEDURE — 99223 1ST HOSP IP/OBS HIGH 75: CPT | Performed by: INTERNAL MEDICINE

## 2025-05-14 PROCEDURE — 88331 PATH CONSLTJ SURG 1 BLK 1SPC: CPT

## 2025-05-14 PROCEDURE — 3E0G76Z INTRODUCTION OF NUTRITIONAL SUBSTANCE INTO UPPER GI, VIA NATURAL OR ARTIFICIAL OPENING: ICD-10-PCS | Performed by: OTOLARYNGOLOGY

## 2025-05-14 PROCEDURE — 0CBP0ZX EXCISION OF TONSILS, OPEN APPROACH, DIAGNOSTIC: ICD-10-PCS | Performed by: OTOLARYNGOLOGY

## 2025-05-14 PROCEDURE — 3600000012 HC SURGERY LEVEL 2 ADDTL 15MIN: Performed by: OTOLARYNGOLOGY

## 2025-05-14 PROCEDURE — 0DH63UZ INSERTION OF FEEDING DEVICE INTO STOMACH, PERCUTANEOUS APPROACH: ICD-10-PCS | Performed by: OTOLARYNGOLOGY

## 2025-05-14 PROCEDURE — 2700000000 HC OXYGEN THERAPY PER DAY

## 2025-05-14 PROCEDURE — 7100000001 HC PACU RECOVERY - ADDTL 15 MIN: Performed by: OTOLARYNGOLOGY

## 2025-05-14 PROCEDURE — 2000000000 HC ICU R&B

## 2025-05-14 PROCEDURE — 88333 PATH CONSLTJ SURG CYTO XM 1: CPT

## 2025-05-14 PROCEDURE — 02HV33Z INSERTION OF INFUSION DEVICE INTO SUPERIOR VENA CAVA, PERCUTANEOUS APPROACH: ICD-10-PCS | Performed by: OTOLARYNGOLOGY

## 2025-05-14 PROCEDURE — 3600000002 HC SURGERY LEVEL 2 BASE: Performed by: OTOLARYNGOLOGY

## 2025-05-14 PROCEDURE — 2709999900 HC NON-CHARGEABLE SUPPLY: Performed by: OTOLARYNGOLOGY

## 2025-05-14 PROCEDURE — 7100000000 HC PACU RECOVERY - FIRST 15 MIN: Performed by: OTOLARYNGOLOGY

## 2025-05-14 PROCEDURE — 2580000003 HC RX 258: Performed by: STUDENT IN AN ORGANIZED HEALTH CARE EDUCATION/TRAINING PROGRAM

## 2025-05-14 RX ORDER — PHENYLEPHRINE HCL IN 0.9% NACL 0.4MG/10ML
SYRINGE (ML) INTRAVENOUS
Status: DISCONTINUED | OUTPATIENT
Start: 2025-05-14 | End: 2025-05-14 | Stop reason: SDUPTHER

## 2025-05-14 RX ORDER — IBUPROFEN 400 MG/1
600 TABLET, FILM COATED ORAL EVERY 8 HOURS PRN
Status: DISCONTINUED | OUTPATIENT
Start: 2025-05-14 | End: 2025-05-17 | Stop reason: HOSPADM

## 2025-05-14 RX ORDER — SODIUM CHLORIDE 0.9 % (FLUSH) 0.9 %
5-40 SYRINGE (ML) INJECTION PRN
Status: DISCONTINUED | OUTPATIENT
Start: 2025-05-14 | End: 2025-05-14 | Stop reason: HOSPADM

## 2025-05-14 RX ORDER — EPINEPHRINE 1 MG/ML(1)
AMPUL (ML) INJECTION PRN
Status: DISCONTINUED | OUTPATIENT
Start: 2025-05-14 | End: 2025-05-14 | Stop reason: HOSPADM

## 2025-05-14 RX ORDER — PROCHLORPERAZINE EDISYLATE 5 MG/ML
5 INJECTION INTRAMUSCULAR; INTRAVENOUS
Status: DISCONTINUED | OUTPATIENT
Start: 2025-05-14 | End: 2025-05-14 | Stop reason: HOSPADM

## 2025-05-14 RX ORDER — SODIUM CHLORIDE 9 MG/ML
INJECTION, SOLUTION INTRAVENOUS PRN
Status: DISCONTINUED | OUTPATIENT
Start: 2025-05-14 | End: 2025-05-14 | Stop reason: HOSPADM

## 2025-05-14 RX ORDER — DEXAMETHASONE SODIUM PHOSPHATE 4 MG/ML
INJECTION, SOLUTION INTRA-ARTICULAR; INTRALESIONAL; INTRAMUSCULAR; INTRAVENOUS; SOFT TISSUE
Status: DISCONTINUED | OUTPATIENT
Start: 2025-05-14 | End: 2025-05-14 | Stop reason: SDUPTHER

## 2025-05-14 RX ORDER — GLYCOPYRROLATE 0.2 MG/ML
INJECTION INTRAMUSCULAR; INTRAVENOUS
Status: DISCONTINUED | OUTPATIENT
Start: 2025-05-14 | End: 2025-05-14 | Stop reason: SDUPTHER

## 2025-05-14 RX ORDER — ACETAMINOPHEN 500 MG
1000 TABLET ORAL ONCE
Status: DISCONTINUED | OUTPATIENT
Start: 2025-05-14 | End: 2025-05-14 | Stop reason: HOSPADM

## 2025-05-14 RX ORDER — LEVOTHYROXINE SODIUM 75 UG/1
37.5 TABLET ORAL DAILY
Status: DISCONTINUED | OUTPATIENT
Start: 2025-05-15 | End: 2025-05-17 | Stop reason: HOSPADM

## 2025-05-14 RX ORDER — BISACODYL 10 MG
10 SUPPOSITORY, RECTAL RECTAL DAILY PRN
Status: DISCONTINUED | OUTPATIENT
Start: 2025-05-14 | End: 2025-05-17 | Stop reason: HOSPADM

## 2025-05-14 RX ORDER — SENNA AND DOCUSATE SODIUM 50; 8.6 MG/1; MG/1
1 TABLET, FILM COATED ORAL 2 TIMES DAILY
Status: DISCONTINUED | OUTPATIENT
Start: 2025-05-14 | End: 2025-05-17 | Stop reason: HOSPADM

## 2025-05-14 RX ORDER — MIDAZOLAM HYDROCHLORIDE 2 MG/2ML
2 INJECTION, SOLUTION INTRAMUSCULAR; INTRAVENOUS PRN
Status: DISCONTINUED | OUTPATIENT
Start: 2025-05-14 | End: 2025-05-14 | Stop reason: HOSPADM

## 2025-05-14 RX ORDER — FENTANYL CITRATE 50 UG/ML
100 INJECTION, SOLUTION INTRAMUSCULAR; INTRAVENOUS
Status: DISCONTINUED | OUTPATIENT
Start: 2025-05-14 | End: 2025-05-14 | Stop reason: HOSPADM

## 2025-05-14 RX ORDER — DEXMEDETOMIDINE HYDROCHLORIDE 100 UG/ML
INJECTION, SOLUTION INTRAVENOUS
Status: DISCONTINUED | OUTPATIENT
Start: 2025-05-14 | End: 2025-05-14 | Stop reason: SDUPTHER

## 2025-05-14 RX ORDER — ROCURONIUM BROMIDE 10 MG/ML
INJECTION, SOLUTION INTRAVENOUS
Status: DISCONTINUED | OUTPATIENT
Start: 2025-05-14 | End: 2025-05-14 | Stop reason: SDUPTHER

## 2025-05-14 RX ORDER — MIDAZOLAM HYDROCHLORIDE 1 MG/ML
INJECTION, SOLUTION INTRAMUSCULAR; INTRAVENOUS
Status: DISCONTINUED | OUTPATIENT
Start: 2025-05-14 | End: 2025-05-14 | Stop reason: SDUPTHER

## 2025-05-14 RX ORDER — NALOXONE HYDROCHLORIDE 0.4 MG/ML
INJECTION, SOLUTION INTRAMUSCULAR; INTRAVENOUS; SUBCUTANEOUS PRN
Status: DISCONTINUED | OUTPATIENT
Start: 2025-05-14 | End: 2025-05-14 | Stop reason: HOSPADM

## 2025-05-14 RX ORDER — HYDROMORPHONE HYDROCHLORIDE 1 MG/ML
0.5 INJECTION, SOLUTION INTRAMUSCULAR; INTRAVENOUS; SUBCUTANEOUS EVERY 5 MIN PRN
Status: DISCONTINUED | OUTPATIENT
Start: 2025-05-14 | End: 2025-05-14 | Stop reason: HOSPADM

## 2025-05-14 RX ORDER — SODIUM CHLORIDE 0.9 % (FLUSH) 0.9 %
5-40 SYRINGE (ML) INJECTION EVERY 12 HOURS SCHEDULED
Status: DISCONTINUED | OUTPATIENT
Start: 2025-05-14 | End: 2025-05-14 | Stop reason: HOSPADM

## 2025-05-14 RX ORDER — SODIUM CHLORIDE 0.9 % (FLUSH) 0.9 %
5-40 SYRINGE (ML) INJECTION EVERY 12 HOURS SCHEDULED
Status: DISCONTINUED | OUTPATIENT
Start: 2025-05-14 | End: 2025-05-17 | Stop reason: HOSPADM

## 2025-05-14 RX ORDER — LIDOCAINE HYDROCHLORIDE AND EPINEPHRINE 10; 10 MG/ML; UG/ML
INJECTION, SOLUTION INFILTRATION; PERINEURAL PRN
Status: DISCONTINUED | OUTPATIENT
Start: 2025-05-14 | End: 2025-05-14 | Stop reason: HOSPADM

## 2025-05-14 RX ORDER — SODIUM CHLORIDE, SODIUM LACTATE, POTASSIUM CHLORIDE, CALCIUM CHLORIDE 600; 310; 30; 20 MG/100ML; MG/100ML; MG/100ML; MG/100ML
INJECTION, SOLUTION INTRAVENOUS
Status: DISCONTINUED | OUTPATIENT
Start: 2025-05-14 | End: 2025-05-14 | Stop reason: SDUPTHER

## 2025-05-14 RX ORDER — SODIUM CHLORIDE 9 MG/ML
INJECTION, SOLUTION INTRAVENOUS PRN
Status: DISCONTINUED | OUTPATIENT
Start: 2025-05-14 | End: 2025-05-17 | Stop reason: HOSPADM

## 2025-05-14 RX ORDER — LIDOCAINE HYDROCHLORIDE 10 MG/ML
1 INJECTION, SOLUTION EPIDURAL; INFILTRATION; INTRACAUDAL; PERINEURAL
Status: DISCONTINUED | OUTPATIENT
Start: 2025-05-14 | End: 2025-05-14 | Stop reason: HOSPADM

## 2025-05-14 RX ORDER — SODIUM CHLORIDE, SODIUM LACTATE, POTASSIUM CHLORIDE, CALCIUM CHLORIDE 600; 310; 30; 20 MG/100ML; MG/100ML; MG/100ML; MG/100ML
INJECTION, SOLUTION INTRAVENOUS CONTINUOUS
Status: DISCONTINUED | OUTPATIENT
Start: 2025-05-14 | End: 2025-05-14 | Stop reason: HOSPADM

## 2025-05-14 RX ORDER — OXYCODONE HYDROCHLORIDE 5 MG/1
5 TABLET ORAL
Status: DISCONTINUED | OUTPATIENT
Start: 2025-05-14 | End: 2025-05-14 | Stop reason: HOSPADM

## 2025-05-14 RX ORDER — ONDANSETRON 4 MG/1
4 TABLET, ORALLY DISINTEGRATING ORAL EVERY 8 HOURS PRN
Status: DISCONTINUED | OUTPATIENT
Start: 2025-05-14 | End: 2025-05-17 | Stop reason: HOSPADM

## 2025-05-14 RX ORDER — SODIUM CHLORIDE 0.9 % (FLUSH) 0.9 %
5-40 SYRINGE (ML) INJECTION PRN
Status: DISCONTINUED | OUTPATIENT
Start: 2025-05-14 | End: 2025-05-17 | Stop reason: HOSPADM

## 2025-05-14 RX ORDER — HYDRALAZINE HYDROCHLORIDE 20 MG/ML
10 INJECTION INTRAMUSCULAR; INTRAVENOUS ONCE
Status: DISCONTINUED | OUTPATIENT
Start: 2025-05-14 | End: 2025-05-14 | Stop reason: HOSPADM

## 2025-05-14 RX ORDER — ONDANSETRON 2 MG/ML
INJECTION INTRAMUSCULAR; INTRAVENOUS
Status: DISCONTINUED | OUTPATIENT
Start: 2025-05-14 | End: 2025-05-14 | Stop reason: SDUPTHER

## 2025-05-14 RX ORDER — PROPOFOL 10 MG/ML
INJECTION, EMULSION INTRAVENOUS
Status: DISCONTINUED | OUTPATIENT
Start: 2025-05-14 | End: 2025-05-14 | Stop reason: SDUPTHER

## 2025-05-14 RX ORDER — FENTANYL CITRATE 50 UG/ML
INJECTION, SOLUTION INTRAMUSCULAR; INTRAVENOUS
Status: DISCONTINUED | OUTPATIENT
Start: 2025-05-14 | End: 2025-05-14 | Stop reason: SDUPTHER

## 2025-05-14 RX ORDER — ONDANSETRON 2 MG/ML
4 INJECTION INTRAMUSCULAR; INTRAVENOUS EVERY 6 HOURS PRN
Status: DISCONTINUED | OUTPATIENT
Start: 2025-05-14 | End: 2025-05-17 | Stop reason: HOSPADM

## 2025-05-14 RX ORDER — OXYCODONE HYDROCHLORIDE 5 MG/1
5 TABLET ORAL EVERY 4 HOURS PRN
Status: DISCONTINUED | OUTPATIENT
Start: 2025-05-14 | End: 2025-05-17 | Stop reason: HOSPADM

## 2025-05-14 RX ORDER — MORPHINE SULFATE 2 MG/ML
2 INJECTION, SOLUTION INTRAMUSCULAR; INTRAVENOUS
Status: DISCONTINUED | OUTPATIENT
Start: 2025-05-14 | End: 2025-05-17 | Stop reason: HOSPADM

## 2025-05-14 RX ORDER — ONDANSETRON 2 MG/ML
4 INJECTION INTRAMUSCULAR; INTRAVENOUS
Status: DISCONTINUED | OUTPATIENT
Start: 2025-05-14 | End: 2025-05-14 | Stop reason: HOSPADM

## 2025-05-14 RX ORDER — FENTANYL CITRATE 50 UG/ML
25 INJECTION, SOLUTION INTRAMUSCULAR; INTRAVENOUS EVERY 5 MIN PRN
Status: COMPLETED | OUTPATIENT
Start: 2025-05-14 | End: 2025-05-14

## 2025-05-14 RX ADMIN — FENTANYL CITRATE 25 MCG: 50 INJECTION INTRAMUSCULAR; INTRAVENOUS at 09:16

## 2025-05-14 RX ADMIN — SODIUM CHLORIDE, SODIUM LACTATE, POTASSIUM CHLORIDE, AND CALCIUM CHLORIDE: .6; .31; .03; .02 INJECTION, SOLUTION INTRAVENOUS at 07:17

## 2025-05-14 RX ADMIN — Medication 120 MCG: at 08:29

## 2025-05-14 RX ADMIN — Medication 120 MCG: at 08:06

## 2025-05-14 RX ADMIN — MORPHINE SULFATE 2 MG: 2 INJECTION, SOLUTION INTRAMUSCULAR; INTRAVENOUS at 13:55

## 2025-05-14 RX ADMIN — DEXMEDETOMIDINE 4 MCG: 100 INJECTION, SOLUTION INTRAVENOUS at 07:55

## 2025-05-14 RX ADMIN — FENTANYL CITRATE 25 MCG: 50 INJECTION INTRAMUSCULAR; INTRAVENOUS at 09:40

## 2025-05-14 RX ADMIN — FENTANYL CITRATE 25 MCG: 50 INJECTION INTRAMUSCULAR; INTRAVENOUS at 09:11

## 2025-05-14 RX ADMIN — ONDANSETRON 4 MG: 2 INJECTION, SOLUTION INTRAMUSCULAR; INTRAVENOUS at 08:19

## 2025-05-14 RX ADMIN — SUGAMMADEX 200 MG: 100 INJECTION, SOLUTION INTRAVENOUS at 08:42

## 2025-05-14 RX ADMIN — FENTANYL CITRATE 25 MCG: 50 INJECTION INTRAMUSCULAR; INTRAVENOUS at 09:25

## 2025-05-14 RX ADMIN — FENTANYL CITRATE 25 MCG: 50 INJECTION INTRAMUSCULAR; INTRAVENOUS at 07:52

## 2025-05-14 RX ADMIN — DEXMEDETOMIDINE 8 MCG: 100 INJECTION, SOLUTION INTRAVENOUS at 07:33

## 2025-05-14 RX ADMIN — MORPHINE SULFATE 2 MG: 2 INJECTION, SOLUTION INTRAMUSCULAR; INTRAVENOUS at 21:46

## 2025-05-14 RX ADMIN — GLYCOPYRROLATE 0.2 MG: 0.2 INJECTION INTRAMUSCULAR; INTRAVENOUS at 07:24

## 2025-05-14 RX ADMIN — DEXMEDETOMIDINE 8 MCG: 100 INJECTION, SOLUTION INTRAVENOUS at 07:39

## 2025-05-14 RX ADMIN — MIDAZOLAM HYDROCHLORIDE 2 MG: 1 INJECTION, SOLUTION INTRAMUSCULAR; INTRAVENOUS at 07:32

## 2025-05-14 RX ADMIN — Medication 120 MCG: at 08:23

## 2025-05-14 RX ADMIN — SODIUM CHLORIDE, POTASSIUM CHLORIDE, SODIUM LACTATE AND CALCIUM CHLORIDE: 600; 310; 30; 20 INJECTION, SOLUTION INTRAVENOUS at 07:15

## 2025-05-14 RX ADMIN — MORPHINE SULFATE 2 MG: 2 INJECTION, SOLUTION INTRAMUSCULAR; INTRAVENOUS at 16:45

## 2025-05-14 RX ADMIN — MIDAZOLAM HYDROCHLORIDE 3 MG: 1 INJECTION, SOLUTION INTRAMUSCULAR; INTRAVENOUS at 07:25

## 2025-05-14 RX ADMIN — WATER 2000 MG: 1 INJECTION INTRAMUSCULAR; INTRAVENOUS; SUBCUTANEOUS at 07:37

## 2025-05-14 RX ADMIN — Medication 80 MCG: at 08:42

## 2025-05-14 RX ADMIN — FENTANYL CITRATE 25 MCG: 50 INJECTION INTRAMUSCULAR; INTRAVENOUS at 07:40

## 2025-05-14 RX ADMIN — SODIUM CHLORIDE, PRESERVATIVE FREE 10 ML: 5 INJECTION INTRAVENOUS at 20:03

## 2025-05-14 RX ADMIN — FENTANYL CITRATE 25 MCG: 50 INJECTION INTRAMUSCULAR; INTRAVENOUS at 07:32

## 2025-05-14 RX ADMIN — OXYCODONE 5 MG: 5 TABLET ORAL at 20:02

## 2025-05-14 RX ADMIN — DEXAMETHASONE SODIUM PHOSPHATE 8 MG: 4 INJECTION INTRA-ARTICULAR; INTRALESIONAL; INTRAMUSCULAR; INTRAVENOUS; SOFT TISSUE at 07:46

## 2025-05-14 RX ADMIN — Medication 120 MCG: at 08:15

## 2025-05-14 RX ADMIN — ROCURONIUM BROMIDE 20 MG: 10 INJECTION, SOLUTION INTRAVENOUS at 08:09

## 2025-05-14 RX ADMIN — Medication 120 MCG: at 08:10

## 2025-05-14 RX ADMIN — PROPOFOL 150 MG: 10 INJECTION, EMULSION INTRAVENOUS at 08:03

## 2025-05-14 RX ADMIN — FENTANYL CITRATE 25 MCG: 50 INJECTION INTRAMUSCULAR; INTRAVENOUS at 07:35

## 2025-05-14 RX ADMIN — Medication 80 MCG: at 08:37

## 2025-05-14 ASSESSMENT — PAIN SCALES - GENERAL
PAINLEVEL_OUTOF10: 3
PAINLEVEL_OUTOF10: 4
PAINLEVEL_OUTOF10: 4
PAINLEVEL_OUTOF10: 2
PAINLEVEL_OUTOF10: 7
PAINLEVEL_OUTOF10: 4
PAINLEVEL_OUTOF10: 7
PAINLEVEL_OUTOF10: 7
PAINLEVEL_OUTOF10: 3

## 2025-05-14 ASSESSMENT — PAIN DESCRIPTION - ONSET
ONSET: ON-GOING
ONSET: ON-GOING

## 2025-05-14 ASSESSMENT — ENCOUNTER SYMPTOMS
SORE THROAT: 1
SHORTNESS OF BREATH: 1
TROUBLE SWALLOWING: 1

## 2025-05-14 ASSESSMENT — PAIN DESCRIPTION - DESCRIPTORS
DESCRIPTORS: SORE
DESCRIPTORS: PRESSURE;SORE

## 2025-05-14 ASSESSMENT — PAIN DESCRIPTION - LOCATION
LOCATION: MOUTH
LOCATION: THROAT
LOCATION: MOUTH
LOCATION: NECK

## 2025-05-14 ASSESSMENT — PAIN DESCRIPTION - PAIN TYPE
TYPE: SURGICAL PAIN
TYPE: SURGICAL PAIN

## 2025-05-14 ASSESSMENT — PAIN DESCRIPTION - FREQUENCY: FREQUENCY: INTERMITTENT

## 2025-05-14 ASSESSMENT — PAIN DESCRIPTION - ORIENTATION
ORIENTATION: ANTERIOR
ORIENTATION: LEFT
ORIENTATION: ANTERIOR
ORIENTATION: LEFT

## 2025-05-14 ASSESSMENT — PAIN - FUNCTIONAL ASSESSMENT
PAIN_FUNCTIONAL_ASSESSMENT: PREVENTS OR INTERFERES SOME ACTIVE ACTIVITIES AND ADLS
PAIN_FUNCTIONAL_ASSESSMENT: ACTIVITIES ARE NOT PREVENTED
PAIN_FUNCTIONAL_ASSESSMENT: ACTIVITIES ARE NOT PREVENTED
PAIN_FUNCTIONAL_ASSESSMENT: 0-10

## 2025-05-14 NOTE — BRIEF OP NOTE
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]    Post-Op Diagnosis: Same       Procedure(s):  TRACHEOSTOMY, LARYNGOSCOPY AND BIOPSY OF LEFT TONSIL MASS    Surgeon(s):  Dae Boswell MD    Assistant:  Surgical Assistant: Robby Wolfe    Anesthesia: General    Estimated Blood Loss (mL): Minimal    Complications: None    Specimens:   ID Type Source Tests Collected by Time Destination   1 : LEFT TONSIL BIOPSY Tissue Tissue SURGICAL PATHOLOGY Dae Boswell MD 5/14/2025 0818    2 : LEFT TONSIL BIOPSY Tissue Tissue SURGICAL PATHOLOGY Dae Boswell MD 5/14/2025 0819        Implants:  * No implants in log *      Drains: * No LDAs found *    Findings:  Infection Present At Time Of Surgery (PATOS) (choose all levels that have infection present):  No infection present  Other Findings: massive left tonsil mass.  Frozen section confirms squamous cell carcinoma.  6 cuffed shiley placed.  Notified RAD/ONC and MED/ONC.  Discussed with wife Tatum Sanchez [phone]    Electronically signed by Dae Boswell MD on 5/14/2025 at 8:44 AM

## 2025-05-14 NOTE — OP NOTE
[address]  [address]                            OPERATIVE REPORT      PATIENT NAME: PATRICIA SYLVESTER                : 1980  MED REC NO: [id number]                       ROOM: OR  ACCOUNT NO: [id number]                       ADMIT DATE: 2025  PROVIDER: Dae Boswell MD    DATE OF SERVICE:  2025    PREOPERATIVE DIAGNOSES:    1. Left tonsil mass.  2. Upper airway compromise    POSTOPERATIVE DIAGNOSES:    1. T4 N2c Mx squamous cell carcinoma, left tonsil.  2. Airway compromise    PROCEDURES PERFORMED:       1. Awake tracheostomy.     2. Direct laryngoscopy with biopsy of left tonsil mass.    SURGEON:  Dae Boswell MD    ASSISTANT:  none    ANESTHESIA:  General    ESTIMATED BLOOD LOSS:  15 mL.    SPECIMENS REMOVED:  Left tonsil biopsy.    INTRAOPERATIVE FINDINGS:       1. Tracheostomy was performed under local anesthesia.     2. A Prmaod flap was formed.     3. A #6 cuffed Shiley was placed.     4. Frozen specimens were taken of the left tonsil, confirmed to be squamous cell carcinoma.  Additional specimen sent for permanent analysis.     COMPLICATIONS:  None.    IMPLANTS:  A #6 cuffed Shiley.    INDICATIONS:  The patient is a 45-year-old gentleman, presenting with a large left tonsil mass partially obstructing his airway.  He presents for tracheostomy and biopsy of the tumor.    DESCRIPTION OF PROCEDURE:  The patient was identified in the preop holding area and he was brought to the operating room and placed in supine position.  Monitored anesthesia was induced and a total of 5 mL of 1% lidocaine with 100,000 parts of epinephrine were injected into the skin anterior to the trachea in the low midline neck.  He was then prepped and draped in a sterile fashion.  A time-out was performed in which we identified his name, medical record number, and the proposed procedure.    I placed a 2 cm horizontal incision 2 fingerbreadths  above the sternal notch with a #15 blade.  I raised the skin flaps to expose the midline neck fat.  A cervical lipectomy was performed.  The strap muscles were divided vertically along the midline raphae.  The thyroid isthmus was divided with the Bovie and cleaned off the anterior wall of the trachea.  A cricoid hook was placed around the cricoid cartilage and used to elevate the trachea out of the wound.  After communicating with the patient and my anesthesia colleagues, I entered the airway with a #15 blade and fashioned it in an inferiorly-based Pramod flap.  This was secured to the inferior aspect of the skin incision with a 2-0 chromic suture.  General anesthesia was then induced.  A #6 cuffed Shiley was inserted into the airway and good ventilation was achieved on the circuit.  This was secured in place with several 2-0 silk sutures and a Velcro tie.  A split gauze dressing was also placed.    The table was then rotated 90 degrees counterclockwise and a direct laryngoscopy was performed with a Dedo laryngoscope.  Before doing that, I placed a mouth guard to protect his dentition.  With a Dedo scope, I came down and easily encountered a large mass coming off the left tonsil obscuring the rest of his airway exam.  Several biopsies were taken for frozen section and also some for permanent specimen.  The pathologist called back reporting a squamous cell carcinoma.  I achieved hemostasis by applying neuro patties hydrated with topical adrenaline.  He had very minimal bleeding from this.  I suctioned all blood and debris before removing the scope.  I then removed the mouth guard and confirmed he had no damage to lips, teeth, or gums.    I then turned care of the patient to my anesthesia colleague, who weaned the anesthetic and transferred him to the PACU in stable condition with plans to go to the ICU.    IV FLUIDS:  1 L.    DRAINS:  None.    DISPOSITION:  PACU, then ICU.    CONDITION:  Stable.        AUSTYN JOHNSON  MD GABINO NELSON/AQS  D:  05/14/2025 08:51:03  T:  05/14/2025 09:51:02  JOB #:  [id number]

## 2025-05-14 NOTE — ANESTHESIA POSTPROCEDURE EVALUATION
Post-Anesthesia Evaluation and Assessment    Patient: Erik Sanchez MRN: [id number]  SSN: [id number]    YOB: 1980  Age: 45 y.o.  Sex: male      I have evaluated the patient and they are stable and ready for discharge from the PACU.     Cardiovascular Function/Vital Signs  Visit Vitals  BP (!) 142/85   Pulse (!) 117   Temp 98.3 °F (36.8 °C) (Oral)   Resp 23   Ht 1.854 m (6' 1\")   Wt 81.6 kg (180 lb)   SpO2 95%   BMI 23.75 kg/m²       Patient is status post General anesthesia for Procedure(s):  TRACHEOSTOMY, LARYNGOSCOPY AND BIOPSY OF LEFT TONSIL MASS.    Nausea/Vomiting: None    Postoperative hydration reviewed and adequate.    Pain:  Managed    Neurological Status:   At baseline    Mental Status, Level of Consciousness: Alert and  oriented to person, place, and time    Pulmonary Status:   Adequate oxygenation and airway patent - on trach collar with fresh trach. O2 sat 97%    Complications related to anesthesia: None    Post-anesthesia assessment completed. No concerns    Signed By: Gretchen Giles DO     May 14, 2025

## 2025-05-14 NOTE — FLOWSHEET NOTE
05/14/25 0755   Family Communication    Relationship to Patient Spouse    Phone Number wife - Tatum [phone]   Family/Significant Other Update Called;Updated   Delivery Origin Nurse   Message Disposition Other (comment)  (wife not on site, available by phone)   Update Given Yes   Family Communication   Family Update Message Surgeon working;Patient stable;Procedure started

## 2025-05-14 NOTE — CONSULTS
CRITICAL CARE  CONSULT NOTE      Name: Erik Sanchez   : 1980   MRN: [id number]   Date: 2025      REASON FOR CONSULT: Post-Op ICU Management      SUBJECTIVE   Erik Sanchez is a 46 y/o male w/ hx of hypothyroidism who presented to outpatient ENT clinic with chief complaint of worsening sore throat and dysphagia for the past 5-6 months. He was found to have large left lateral pharyngeal wall/tonsillar mass and lymphadenopathy concerning for squamous cell carcinoma. He reports associated symptoms of recent shortness of breath and weight loss which he attributes to dysphagia. Presented today for scheduled biopsy and tracheostomy by ENT Dr. Boswell. Transferred to ICU for post-operative observation and management. Patient doing well at this time. Reports a mild neck soreness at trach site but otherwise well.     Review of Systems   Constitutional:  Positive for unexpected weight change.   HENT:  Positive for sore throat and trouble swallowing.    Respiratory:  Positive for shortness of breath.    All other systems reviewed and are negative.      OBJECTIVE   BP (!) 129/95   Pulse (!) 102   Temp 99.5 °F (37.5 °C) (Oral)   Resp 15   Ht 1.854 m (6' 1\")   Wt 69.8 kg (153 lb 14.1 oz)   SpO2 97%   BMI 20.30 kg/m²      Temp (24hrs), Av.7 °F (37.1 °C), Min:97.7 °F (36.5 °C), Max:99.5 °F (37.5 °C)           Physical Exam  GEN: awake, alert, and oriented, well appearing, no acute distress  HEENT  -Head: Normocephalic, atraumatic  -Eyes: PERRL, EOMI. No discharge or redness  -Ears: External ears are normal  -Nose: Normal nares  -Mouth and throat: MMM. Good dentition.  NECK: Supple, tracheostomy midline, dressings with some blood stain but otherwise clean and dry, no masses. No JVD   CV: Regular rate and rhythm, no m/r/g. 2+ radial pulses. Brisk cap refill  LUNGS: CTAB, no distress, equal chest rise, no w/r/c.  ABD: Soft, non-distended, no masses, non tender to palpation  SKIN: Warm, well perfused. No  high risk airway     Hypothyroidism  - home synthroid       NOTE OF PERSONAL INVOLVEMENT IN CARE   I had a face to face encounter with the patient, reviewed and interpreted patient data including clinical events, labs, images, vital signs, I/O's, and examined patient.  I have discussed the case and the plan and management of the patient's care with the consulting services, the bedside nurses and the respiratory therapist.      CRITICAL CARE DOCUMENTATION  This patient has a high probability of imminent, clinically significant deterioration, which requires the highest level of preparedness to intervene urgently. I participated in the decision-making and personally managed or directed the management of the following life and organ supporting interventions that required my frequent assessment to treat or prevent imminent deterioration.    I personally spent 45 minutes of critical care time.  This is time spent at this critically ill patient's bedside actively involved in patient care as well as the coordination of care.  This does not include any procedural time which has been billed separately.    Reuben Kathleen PA-C   Critical Care Medicine  [facility]  05/14/25 6:58 PM

## 2025-05-14 NOTE — PLAN OF CARE
Speech LAnguage Pathology EVALUATION    Patient: Erik Sanchez (45 y.o. male)  Date: 5/14/2025  Primary Diagnosis: Cancer of tonsil ([facility]) [C09.9]  Squamous cell carcinoma of left tonsil (HCC) [C09.9]  Procedure(s) (LRB):  TRACHEOSTOMY, LARYNGOSCOPY AND BIOPSY OF LEFT TONSIL MASS (Left) * Day of Surgery *   Precautions: Aspiration, Trach                    ASSESSMENT:  Patient tolerated PMV speaking valve for 30 minutes with functional voicing and no significant change in SPO2 or RR. HR elevated to 140s while coughing but quickly returned back  as coughing subsided. Patient presents with potential dysphagia symptoms which warrant further evaluation via instrumental swallow study. Strong cough response to purees observed. Overt tolerance to thin liquids noted. Recommend clear liquid diet pending MBS to further assess swallow safety/efficiency. Education provided to patient/family re: anatomy/physiology related to trach/PMV and swallowing, PMV placement and precautions, and swallowing plan of care.     Patient will benefit from skilled intervention to address the above impairments.     PLAN :  Recommendations and Planned Interventions:  Diet: Clear liquids (thin liquids)  -MBS to further assess swallow physiology (Tentatively planned for tomorrow 5/15)  -Oral medications crushed in purees (Check with Pharmacy prior to crushing medications)  -Oral care at least TID  -Place PMV for PO intake    PMV Placement Recommendations: SLP and with staff supervision (RN, RT, MD)    Acute SLP Services: SLP Plan of Care: 4 times/week. Patient's rehabilitation potential is considered to be Good.  Discharge Recommendations: Yes, recommend SLP treatment at next level of care     SUBJECTIVE:   Patient stated, “It sounds better” re: his voice prior to biopsy/trach.    OBJECTIVE:     Past Medical History:   Diagnosis Date    Bipolar II disorder, hypomanic episode, with seasonal pattern ([facility])     dx 1 year ago    GERD  (gastroesophageal reflux disease) 2016    No longer an issue    Hypothyroidism 2023    Substance abuse (HCC)     Sober since January 2022     Past Surgical History:   Procedure Laterality Date    COLONOSCOPY  2015    TRACHEOSTOMY Left 5/14/2025    TRACHEOSTOMY, LARYNGOSCOPY AND BIOPSY OF LEFT TONSIL MASS performed by Dae Boswell MD at [facility] MAIN OR    WISDOM TOOTH EXTRACTION  1996     Baseline Assessment:  Current Diet: Full liquids  Prior Dysphagia History: Patient endorses dysphagia symptoms prior to trach and biopsy. He describes as discomfort/difference on L side of throat while swallowing. Family reports patient does have intermittent coughing with oral intake.     Cognitive and Communication Status:  Neurologic State: Alert  Orientation Level: Oriented x4  Cognition: Appropriate decision making, Appropriate for age attention/concentration, Appropriate safety awareness, and Follows commands    Dysphagia:  Oral Assessment:  Labial: No impairment  Dentition: Natural  Oral Hygiene: Clean;Moist  Lingual: No impairment  Velum: No Impairment  Mandible: No impairment    P.O. Trials:  Assessment Method(s): Observation  Patient Position: Upright in bed  Consistency Presented: Thin;Pureed  How Presented: Self-fed/presented;Straw;Spoon  Bolus Acceptance: No impairment  Bolus Formation/Control: No impairment  Propulsion: No impairment  Oral Residue: None  Initiation of Swallow: Present  Laryngeal Elevation: Present  Aspiration Signs/Symptoms: Strong cough  Pharyngeal Phase Characteristics: Will benefit from further assessment (Patient agreeable to either MBS/FEES)    Perceptual Voice Evaluation (GRBAS Scale):  Grade: 1= mild degree  Roughness: 1= mild degree  Breathiness: 0=normal  Asthenia: 0=normal  Strain: 0=normal    Respiratory Status/Airway:  Trach collar 8L at 40%  Trach Size: 6  Trach Type: Shiley cuffed  Trach Status: Deflated  Trach Placement: 5/14/25 by ENT Dr. Elbert Shafer Speaking  Valve  Passy-Maura Speaking Valve Type: PMV-2001 (Purple)  Coughing: Minimal  Secretions: Minimal, Sputum + Blood  Anxiety: No  Passy-Maura Valve Tolerance - Minutes: 30  PMV Placement Recommendations: SLP and with staff supervision (RN, RT, MD)     Outcome Measure:  Functional Oral Intake Scale (FOIS): 4--Total oral diet of a single consistency    After treatment:   Patient left in no apparent distress in bed, Call bell left within reach, Nursing notified, and Caregiver present    COMMUNICATION/EDUCATION:   The patient's plan of care including recommendations, planned interventions were discussed with: Registered nurse    Patient/family have participated as able in goal setting and plan of care and Patient/family agree to work toward stated goals and plan of care    Thank you,  Lavonne Sam, SLP    SLP Individual Minutes  Time In: 1430  Time Out: 1500  Minutes: 30      Problem: SLP Adult - Impaired Swallowing  Goal: By Discharge: Advance to least restrictive diet without signs or symptoms of aspiration for planned discharge setting.  See evaluation for individualized goals.  Description: Speech Therapy Goals:  1. Patient will tolerate safest and most efficient PO diet without clinical sequelae of aspiration. Goal initiated 5/14.   2. Patient will complete instrumental swallow assessment to objectively evaluate swallow function. Goal initiated 5/14.  3. Patient will tolerate PMV throughout day with no significant change in vitals. Goal initiated 5/14.   4. Patient/family will be educated on trach/PMV management. Goal initiated 5/14.  Outcome: Progressing

## 2025-05-14 NOTE — ANESTHESIA PRE PROCEDURE
Department of Anesthesiology  Preprocedure Note       Name:  Erik Sanchez   Age:  45 y.o.  :  1980                                          MRN:  [id number]         Date:  2025      Surgeon: Surgeon(s):  Dae Boswell MD    Procedure: Procedure(s):  LARYNGOSCOPY AND BIOPSY OF LEFT TONSIL MASS, POSSIBLE TRACHEOSTOMY    Medications prior to admission:   Prior to Admission medications    Medication Sig Start Date End Date Taking? Authorizing Provider   levothyroxine (SYNTHROID) 25 MCG tablet Take 1.5 tablets by mouth daily 5/12/25 8/10/25  Sada Tran MD       Current medications:    No current facility-administered medications for this encounter.       Allergies:  No Known Allergies    Problem List:    Patient Active Problem List   Diagnosis Code   • History of alcohol use Z87.898   • Bipolar depression (HCC) F31.9       Past Medical History:        Diagnosis Date   • Bipolar II disorder, hypomanic episode, with seasonal pattern (HCC)     dx 1 year ago   • GERD (gastroesophageal reflux disease) 2016    No longer an issue   • Hypothyroidism    • Substance abuse (HCC)     Sober since 2022       Past Surgical History:        Procedure Laterality Date   • COLONOSCOPY     • WISDOM TOOTH EXTRACTION         Social History:    Social History     Tobacco Use   • Smoking status: Never     Passive exposure: Past   • Smokeless tobacco: Former     Quit date: 2003   Substance Use Topics   • Alcohol use: Not Currently                                Counseling given: Not Answered      Vital Signs (Current):   Vitals:    25 0957   Weight: 81.6 kg (180 lb)   Height: 1.854 m (6' 1\")                                              BP Readings from Last 3 Encounters:   25 99/65   25 113/76   24 130/75       NPO Status:                                                                                 BMI:   Wt Readings from Last 3 Encounters:   25 81.6 kg (180 lb)

## 2025-05-14 NOTE — PROGRESS NOTES
4 Eyes Skin Assessment     NAME:  Erik Sanchez  YOB: 1980  MEDICAL RECORD NUMBER:  [id number]    The patient is being assessed for  Admission    I agree that at least one RN has performed a thorough Head to Toe Skin Assessment on the patient. ALL assessment sites listed below have been assessed.      Areas assessed by both nurses:    Head, Face, Ears, Shoulders, Back, Chest, Arms, Elbows, Hands, Sacrum. Buttock, Coccyx, Ischium, Legs. Feet and Heels, and Under Medical Devices         Does the Patient have a Wound? No noted wound(s)       Hardeep Prevention initiated by RN: Yes  Wound Care Orders initiated by RN: No    Pressure Injury (Stage 3,4, Unstageable, DTI, NWPT, and Complex wounds) if present, place Wound referral order by RN under : No    New Ostomies, if present place, Ostomy referral order under : No     Nurse 1 eSignature: Electronically signed by Junaa Dominguez RN on 5/14/25 at 7:19 PM EDT    **SHARE this note so that the co-signing nurse can place an eSignature**    Nurse 2 eSignature: Electronically signed by Lu Olivera RN on 5/14/25 at 7:19 PM EDT

## 2025-05-14 NOTE — CONSULTS
[facility]  [address], [address] [facility] [address]  W: [phone]  F: [phone]    Reason for Evaluation:   Erik Sanchez is a 45 y.o. male who is seen in consultation at the request of Dr. Boswell for evaluation of tonsillar carci withnoma- L tonsil    Hematology/Oncology Treatment History:   5/14/2025: LARYNGOSCOPY AND BIOPSY OF LEFT TONSIL MASS, TRACHEOSTOMY   [place]zen pathology: squamous cell carcinoma, finally pathology pending    History of Present Illness:   Erik Sanchez is a pleasant 45 y.o. male who presents today for evaluation of tonsillar squamous cell carcinoma.. Underwent laryngoscopy of biopsy of left tonsillar mass and tracheostomy with Dr. Boswell 5/14/2025.   Seen by his PCP in 1/2025 with sore throat, drainage, cough, and chest discomfort. Initially diagnosed with laryngitis, XR chest negative for acute process. CT Soft Tissue Neck W Contrast completed 5/7 revealing 7.9 cm x 3.4 cm x 3.1 cm heterogeneously  enhancing necrotic mass spanning from the left oropharynx through larynx, compatible with reported malignancy; associated moderate to severe narrowing of the airway at the level of the larynx. Also found to have Several necrotic bilateral level cervical lymph nodes measuring up to 1.8 cm in the left level 2 region.     Supine with trach collar in place, his wife Tatum present at bedside. Alert, participatory.     Review of systems was obtained and pertinent findings reviewed above. Past medical history, social history, family history, medications, and allergies are located in the electronic medical record.    Physical Exam:   /73   Pulse 82   Temp 98.3 °F (36.8 °C) (Oral)   Resp 15   Ht 1.854 m (6' 1\")   Wt 81.6 kg (180 lb)   SpO2 96%   BMI 23.75 kg/m²   ECOG PS: 1  General: no distress  Eyes: anicteric sclerae  HENT:   Neck: Trach in place, oozing   Respiratory: normal respiratory effort, trach collar   CV: no peripheral edema, RRR  Ext: warm, well

## 2025-05-14 NOTE — H&P
OTOLARYNGOLOGY - HEAD AND NECK SURGERY HISTORY AND PHYSICAL    CC:   Left throat mass    HPI:     Erik Sanchez is a 45 y.o. male with a large left lateral pharyngeal wall/tonsil mass and lymphadenopathy concerning for squamous cell carcinoma.  He has noted continued dysphagia.  Breathing ok right now.  No major changes since office visit.  Had CT showing large mass and likely difficult intubation.      Past Medical History:   Diagnosis Date    Bipolar II disorder, hypomanic episode, with seasonal pattern ([facility])     dx 1 year ago    GERD (gastroesophageal reflux disease) 2016    No longer an issue    Hypothyroidism 2023    Substance abuse ([facility])     Sober since January 2022     Past Surgical History:   Procedure Laterality Date    COLONOSCOPY  2015    WISDOM TOOTH EXTRACTION  1996     Current Facility-Administered Medications   Medication Dose Route Frequency    lidocaine PF 1 % injection 1 mL  1 mL IntraDERmal Once PRN    acetaminophen (TYLENOL) tablet 1,000 mg  1,000 mg Oral Once    fentaNYL (SUBLIMAZE) injection 100 mcg  100 mcg IntraVENous Once PRN    lactated ringers infusion   IntraVENous Continuous    sodium chloride flush 0.9 % injection 5-40 mL  5-40 mL IntraVENous 2 times per day    sodium chloride flush 0.9 % injection 5-40 mL  5-40 mL IntraVENous PRN    0.9 % sodium chloride infusion   IntraVENous PRN    midazolam PF (VERSED) injection 2 mg  2 mg IntraVENous PRN    ceFAZolin (ANCEF) 2,000 mg in sterile water 20 mL IV syringe  2,000 mg IntraVENous Once      No Known Allergies  Family History   Problem Relation Age of Onset    Hypertension Mother     Alcohol Abuse Mother     Depression Mother     High Blood Pressure Mother     High Cholesterol Mother     Hypertension Father     High Blood Pressure Father     High Cholesterol Father     Thyroid Disease Sister     No Known Problems Sister     Alcohol Abuse Maternal Aunt     Alcohol Abuse Paternal Uncle     Substance Abuse Paternal Uncle     Alcohol Abuse  Maternal Grandfather     Alcohol Abuse Maternal Cousin     Diabetes Paternal Cousin     Substance Abuse Paternal Cousin     Anesth Problems Neg Hx      Social History     Tobacco Use    Smoking status: Never     Passive exposure: Past    Smokeless tobacco: Former     Quit date: 1/8/2003   Vaping Use    Vaping status: Never Used   Substance Use Topics    Alcohol use: Not Currently    Drug use: Not Currently         REVIEW OF SYSTEMS  A full 10 point review of systems was performed today. The review was non-pertinent other than the details already listed in the history of present illness.     BP (!) 137/94   Pulse 81   Temp 98.3 °F (36.8 °C) (Oral)   Ht 1.854 m (6' 1\")   Wt 81.6 kg (180 lb)   SpO2 96%   BMI 23.75 kg/m²      PHYSICAL EXAM  General:  No acute distress. Alert and oriented x 3. Muffled/hot potato voice  MSK:   Normal muscle bulk  Psych:  Mood and affect appropriate.   Neuro:  CN II - XII grossly intact bilaterally.   Eyes:  PERRL/EOMI, no nystagmus.   ENT:   Left tonsil mass  Lymph:  Neck soft and supple without lymphadenopathy.   Resp:   No audible stridor or wheezing.   Skin:   Head and neck skin is without suspicious lesions.      ASSESSMENT/PLAN:  We reviewed the risks of surgery including need for additional surgery, bleeding, infection, loss of airway, need for additional treatment (this is not a curative cancer operation), the need for hospitalization for tracheostomy care, and the general risks of anesthesia including death as explained by the anesthesiology team.  Written consent obtained for tracheostomy and laryngoscopy with biopsy of mass.  Will plan to proceed with procedure as discussed.      Dae Boswell MD  [facility]  [address], [address]  [place] [address]   (o) [phone]  (f) [phone]   Cell: [phone]

## 2025-05-14 NOTE — PROGRESS NOTES
1245 TRANSFER - IN REPORT:    Verbal report received from PACU RN on Erik Sanchez  being received from PACU for routine post-op      Report consisted of patient's Situation, Background, Assessment and   Recommendations(SBAR).     Information from the following report(s) Nurse Handoff Report, Surgery Report, Intake/Output, MAR, and Cardiac Rhythm NSR/ST  was reviewed with the receiving nurse.    Opportunity for questions and clarification was provided.      Assessment completed upon patient's arrival to unit and care assumed.      1645 Trach dressing changed    TRANSFER - OUT REPORT:    Verbal report given to DARIO Pathak on Erik Sanchez  being transferred to ICU for routine post-op (lateral transfer)    Report consisted of patient's Situation, Background, Assessment and   Recommendations(SBAR).     Information from the following report(s) Nurse Handoff Report, Intake/Output, MAR, Recent Results, and Cardiac Rhythm NSR/ST  was reviewed with the receiving nurse.       Opportunity for questions and clarification was provided.      Patient transported with:  Monitor, Registered Nurse, and Tech

## 2025-05-15 ENCOUNTER — APPOINTMENT (OUTPATIENT)
Facility: HOSPITAL | Age: 45
DRG: 097 | End: 2025-05-15
Attending: OTOLARYNGOLOGY
Payer: COMMERCIAL

## 2025-05-15 ENCOUNTER — HOSPITAL ENCOUNTER (INPATIENT)
Facility: HOSPITAL | Age: 45
Discharge: HOME | DRG: 097 | End: 2025-05-18
Attending: OTOLARYNGOLOGY
Payer: COMMERCIAL

## 2025-05-15 VITALS
DIASTOLIC BLOOD PRESSURE: 79 MMHG | HEART RATE: 56 BPM | SYSTOLIC BLOOD PRESSURE: 102 MMHG | RESPIRATION RATE: 10 BRPM | TEMPERATURE: 98.9 F | OXYGEN SATURATION: 99 %

## 2025-05-15 PROBLEM — E43 SEVERE PROTEIN-CALORIE MALNUTRITION: Status: ACTIVE | Noted: 2025-05-15

## 2025-05-15 LAB
ANION GAP SERPL CALC-SCNC: 5 MMOL/L (ref 2–12)
BUN SERPL-MCNC: 10 MG/DL (ref 6–20)
BUN/CREAT SERPL: 14 (ref 12–20)
CALCIUM SERPL-MCNC: 9.3 MG/DL (ref 8.5–10.1)
CHLORIDE SERPL-SCNC: 102 MMOL/L (ref 97–108)
CO2 SERPL-SCNC: 29 MMOL/L (ref 21–32)
CREAT SERPL-MCNC: 0.69 MG/DL (ref 0.7–1.3)
ERYTHROCYTE [DISTWIDTH] IN BLOOD BY AUTOMATED COUNT: 12 % (ref 11.5–14.5)
GLUCOSE SERPL-MCNC: 111 MG/DL (ref 65–100)
HCT VFR BLD AUTO: 36.7 % (ref 36.6–50.3)
HGB BLD-MCNC: 12.6 G/DL (ref 12.1–17)
MAGNESIUM SERPL-MCNC: 2 MG/DL (ref 1.6–2.4)
MCH RBC QN AUTO: 28.4 PG (ref 26–34)
MCHC RBC AUTO-ENTMCNC: 34.3 G/DL (ref 30–36.5)
MCV RBC AUTO: 82.8 FL (ref 80–99)
NRBC # BLD: 0 K/UL (ref 0–0.01)
NRBC BLD-RTO: 0 PER 100 WBC
PLATELET # BLD AUTO: 190 K/UL (ref 150–400)
PMV BLD AUTO: 9.8 FL (ref 8.9–12.9)
POTASSIUM SERPL-SCNC: 4 MMOL/L (ref 3.5–5.1)
RBC # BLD AUTO: 4.43 M/UL (ref 4.1–5.7)
SODIUM SERPL-SCNC: 136 MMOL/L (ref 136–145)
WBC # BLD AUTO: 10.1 K/UL (ref 4.1–11.1)

## 2025-05-15 PROCEDURE — 6360000002 HC RX W HCPCS: Performed by: OTOLARYNGOLOGY

## 2025-05-15 PROCEDURE — 2700000000 HC OXYGEN THERAPY PER DAY

## 2025-05-15 PROCEDURE — 2709999900 IR PORT PLACEMENT > 5 YEARS

## 2025-05-15 PROCEDURE — 6370000000 HC RX 637 (ALT 250 FOR IP): Performed by: OTOLARYNGOLOGY

## 2025-05-15 PROCEDURE — 85027 COMPLETE CBC AUTOMATED: CPT

## 2025-05-15 PROCEDURE — 76937 US GUIDE VASCULAR ACCESS: CPT

## 2025-05-15 PROCEDURE — 82105 ALPHA-FETOPROTEIN SERUM: CPT

## 2025-05-15 PROCEDURE — 2500000003 HC RX 250 WO HCPCS: Performed by: OTOLARYNGOLOGY

## 2025-05-15 PROCEDURE — 92611 MOTION FLUOROSCOPY/SWALLOW: CPT

## 2025-05-15 PROCEDURE — 2060000000 HC ICU INTERMEDIATE R&B

## 2025-05-15 PROCEDURE — 83735 ASSAY OF MAGNESIUM: CPT

## 2025-05-15 PROCEDURE — 99233 SBSQ HOSP IP/OBS HIGH 50: CPT | Performed by: INTERNAL MEDICINE

## 2025-05-15 PROCEDURE — 2500000003 HC RX 250 WO HCPCS

## 2025-05-15 PROCEDURE — 92507 TX SP LANG VOICE COMM INDIV: CPT

## 2025-05-15 PROCEDURE — 74230 X-RAY XM SWLNG FUNCJ C+: CPT

## 2025-05-15 PROCEDURE — 97550 CAREGIVER TRAING 1ST 30 MIN: CPT

## 2025-05-15 PROCEDURE — 71260 CT THORAX DX C+: CPT

## 2025-05-15 PROCEDURE — 80048 BASIC METABOLIC PNL TOTAL CA: CPT

## 2025-05-15 PROCEDURE — 6360000004 HC RX CONTRAST MEDICATION: Performed by: NURSE PRACTITIONER

## 2025-05-15 PROCEDURE — 6360000002 HC RX W HCPCS

## 2025-05-15 RX ORDER — FENTANYL CITRATE 50 UG/ML
INJECTION, SOLUTION INTRAMUSCULAR; INTRAVENOUS PRN
Status: COMPLETED | OUTPATIENT
Start: 2025-05-15 | End: 2025-05-15

## 2025-05-15 RX ORDER — MIDAZOLAM HYDROCHLORIDE 2 MG/2ML
INJECTION, SOLUTION INTRAMUSCULAR; INTRAVENOUS PRN
Status: COMPLETED | OUTPATIENT
Start: 2025-05-15 | End: 2025-05-15

## 2025-05-15 RX ORDER — LIDOCAINE HYDROCHLORIDE 10 MG/ML
INJECTION, SOLUTION EPIDURAL; INFILTRATION; INTRACAUDAL; PERINEURAL PRN
Status: COMPLETED | OUTPATIENT
Start: 2025-05-15 | End: 2025-05-15

## 2025-05-15 RX ORDER — IOPAMIDOL 755 MG/ML
100 INJECTION, SOLUTION INTRAVASCULAR
Status: COMPLETED | OUTPATIENT
Start: 2025-05-15 | End: 2025-05-15

## 2025-05-15 RX ORDER — ONDANSETRON 2 MG/ML
INJECTION INTRAMUSCULAR; INTRAVENOUS PRN
Status: COMPLETED | OUTPATIENT
Start: 2025-05-15 | End: 2025-05-15

## 2025-05-15 RX ADMIN — WATER 2000 MG: 1 INJECTION INTRAMUSCULAR; INTRAVENOUS; SUBCUTANEOUS at 09:30

## 2025-05-15 RX ADMIN — SENNOSIDES AND DOCUSATE SODIUM 1 TABLET: 50; 8.6 TABLET ORAL at 08:35

## 2025-05-15 RX ADMIN — MORPHINE SULFATE 2 MG: 2 INJECTION, SOLUTION INTRAMUSCULAR; INTRAVENOUS at 13:29

## 2025-05-15 RX ADMIN — IOPAMIDOL 70 ML: 755 INJECTION, SOLUTION INTRAVENOUS at 03:27

## 2025-05-15 RX ADMIN — SENNOSIDES AND DOCUSATE SODIUM 1 TABLET: 50; 8.6 TABLET ORAL at 20:35

## 2025-05-15 RX ADMIN — ONDANSETRON 4 MG: 2 INJECTION INTRAMUSCULAR; INTRAVENOUS at 10:00

## 2025-05-15 RX ADMIN — MORPHINE SULFATE 2 MG: 2 INJECTION, SOLUTION INTRAMUSCULAR; INTRAVENOUS at 02:58

## 2025-05-15 RX ADMIN — MIDAZOLAM HYDROCHLORIDE 1 MG: 1 INJECTION, SOLUTION INTRAMUSCULAR; INTRAVENOUS at 09:45

## 2025-05-15 RX ADMIN — FENTANYL CITRATE 50 MCG: 50 INJECTION INTRAMUSCULAR; INTRAVENOUS at 09:55

## 2025-05-15 RX ADMIN — LIDOCAINE HYDROCHLORIDE 10 ML: 10 INJECTION, SOLUTION EPIDURAL; INFILTRATION; INTRACAUDAL; PERINEURAL at 09:48

## 2025-05-15 RX ADMIN — MORPHINE SULFATE 2 MG: 2 INJECTION, SOLUTION INTRAMUSCULAR; INTRAVENOUS at 07:48

## 2025-05-15 RX ADMIN — LEVOTHYROXINE SODIUM 37.5 MCG: 0.07 TABLET ORAL at 08:35

## 2025-05-15 RX ADMIN — MORPHINE SULFATE 2 MG: 2 INJECTION, SOLUTION INTRAMUSCULAR; INTRAVENOUS at 20:36

## 2025-05-15 RX ADMIN — SODIUM CHLORIDE, PRESERVATIVE FREE 10 ML: 5 INJECTION INTRAVENOUS at 20:36

## 2025-05-15 RX ADMIN — SODIUM CHLORIDE, PRESERVATIVE FREE 10 ML: 5 INJECTION INTRAVENOUS at 08:36

## 2025-05-15 RX ADMIN — FENTANYL CITRATE 50 MCG: 50 INJECTION INTRAMUSCULAR; INTRAVENOUS at 09:45

## 2025-05-15 ASSESSMENT — PAIN SCALES - GENERAL
PAINLEVEL_OUTOF10: 7
PAINLEVEL_OUTOF10: 0
PAINLEVEL_OUTOF10: 7
PAINLEVEL_OUTOF10: 4
PAINLEVEL_OUTOF10: 7
PAINLEVEL_OUTOF10: 5
PAINLEVEL_OUTOF10: 3
PAINLEVEL_OUTOF10: 4
PAINLEVEL_OUTOF10: 0
PAINLEVEL_OUTOF10: 7
PAINLEVEL_OUTOF10: 7
PAINLEVEL_OUTOF10: 2
PAINLEVEL_OUTOF10: 6
PAINLEVEL_OUTOF10: 5

## 2025-05-15 ASSESSMENT — PAIN DESCRIPTION - ORIENTATION
ORIENTATION: ANTERIOR

## 2025-05-15 ASSESSMENT — PAIN DESCRIPTION - PAIN TYPE
TYPE: SURGICAL PAIN
TYPE: SURGICAL PAIN

## 2025-05-15 ASSESSMENT — PULMONARY FUNCTION TESTS
PIF_VALUE: 22
PIF_VALUE: 21
PIF_VALUE: 21
PIF_VALUE: 22
PIF_VALUE: 21
PIF_VALUE: 22

## 2025-05-15 ASSESSMENT — PAIN DESCRIPTION - DESCRIPTORS
DESCRIPTORS: ACHING
DESCRIPTORS: ACHING
DESCRIPTORS: SORE

## 2025-05-15 ASSESSMENT — PAIN DESCRIPTION - ONSET: ONSET: ON-GOING

## 2025-05-15 ASSESSMENT — PAIN DESCRIPTION - LOCATION
LOCATION: NECK
LOCATION: NECK
LOCATION: THROAT;NECK

## 2025-05-15 ASSESSMENT — PAIN DESCRIPTION - FREQUENCY: FREQUENCY: INTERMITTENT

## 2025-05-15 NOTE — CARE COORDINATION
Care Management Initial Assessment       RUR: 6%   Readmission? No     05/15/25 1602   Service Assessment   Patient Orientation Alert and Oriented;Person;Place;Situation;Self   Cognition Alert   History Provided By Patient   Primary Caregiver Self   Support Systems Spouse/Significant Other  (Wife Tatum Sanchez [phone])   Patient's Healthcare Decision Maker is: Legal Next of Kin   PCP Verified by CM Yes  (Dr Sada Tran)   Last Visit to PCP Within last 6 months   Prior Functional Level Independent in ADLs/IADLs   Current Functional Level Assistance with the following:;Mobility   Can patient return to prior living arrangement Yes   Ability to make needs known: Good   Family able to assist with home care needs: Yes   Would you like for me to discuss the discharge plan with any other family members/significant others, and if so, who? Yes  (Wife)   Financial Resources Other (Comment)  ([employer])   Community Resources None   Social/Functional History   Lives With Spouse   Type of Home House   Home Layout Two level   Home Access Stairs to enter with rails   Entrance Stairs - Number of Steps 2   Entrance Stairs - Rails Both   Bathroom Shower/Tub Tub/Shower unit   Bathroom Toilet Standard   Bathroom Equipment Shower chair;Grab bars in shower   Home Equipment None   Receives Help From Family   Prior Level of Assist for ADLs Independent   Prior Level of Assist for Homemaking Independent   Homemaking Responsibilities Yes   Ambulation Assistance Independent   Prior Level of Assist for Transfers Independent   Active  Yes   Mode of Transportation Car   Occupation Self employed   Discharge Planning   Type of Residence House   Living Arrangements Spouse/Significant Other;Children   Current Services Prior To Admission None   Potential Assistance Purchasing Medications No   Patient expects to be discharged to: House   History of falls? 0     Patient admitted for a scheduled trach placement.  CM met with

## 2025-05-15 NOTE — PLAN OF CARE
Problem: Pain  Goal: Verbalizes/displays adequate comfort level or baseline comfort level  Outcome: Progressing  Flowsheets (Taken 5/14/2025 2000)  Verbalizes/displays adequate comfort level or baseline comfort level:   Encourage patient to monitor pain and request assistance   Assess pain using appropriate pain scale   Administer analgesics based on type and severity of pain and evaluate response   Implement non-pharmacological measures as appropriate and evaluate response   Consider cultural and social influences on pain and pain management   Notify Licensed Independent Practitioner if interventions unsuccessful or patient reports new pain     Problem: Discharge Planning  Goal: Discharge to home or other facility with appropriate resources  Outcome: Progressing  Flowsheets (Taken 5/14/2025 2000)  Discharge to home or other facility with appropriate resources:   Identify barriers to discharge with patient and caregiver   Arrange for needed discharge resources and transportation as appropriate   Identify discharge learning needs (meds, wound care, etc)   Arrange for interpreters to assist at discharge as needed   Refer to discharge planning if patient needs post-hospital services based on physician order or complex needs related to functional status, cognitive ability or social support system     Problem: Safety - Adult  Goal: Free from fall injury  Outcome: Progressing

## 2025-05-15 NOTE — CONSULTS
Comprehensive Nutrition Assessment    Type and Reason for Visit: Initial, Consult    Nutrition Recommendations/Plan:   Advance diet per SLP  Check Phosphorous with daily AM labs  Consult GI for PEG tube placement  Potential refeeding risk: start supplementation with 100 mg B1 x 7 days and MVI daily       Malnutrition Assessment:  Malnutrition Status:  Severe malnutrition (05/15/25 1217)    Context:  Chronic Illness     Findings of the 6 clinical characteristics of malnutrition:  Energy Intake:  75% or less estimated energy requirements for 1 month or longer  Weight Loss:  Greater than 10% over 6 months     Body Fat Loss:  Mild body fat loss Orbital, Buccal region   Muscle Mass Loss:  Mild muscle mass loss Thigh (quadriceps), Clavicles (pectoralis & deltoids)  Fluid Accumulation:  No fluid accumulation     Strength:  Not Performed       Nutrition Assessment:    Pt admitted with Tonsillar Cancer. PMHx: GERD, Hypothyroidism. New dx Squamous cell carcinoma of left tonsil-s/p Laryngoscopy and Bx of left tonsil mass/ tracheostomy yesterday. Oncology and SLP consulted.     Pt meets criteria for severe malnutrition. Dysphagia with associated weight loss PTA d/t tonsillar mass. Wants to have G-tube placed; agree that this is indicated. PO intake currently poor and expect will not improve once treatment begins. SLP following-possible MBS today?    If able to advance diet recommend adding Ensure Plus HP supplements to concentrate PO intake. Note pt is at potential risk for refeeding (see above)-especially if/when starts on EN support. Lytes currently WNL however phosphorous not checked.     Suggest the following EN goal if tube placed this admission: 250 ml Two Dionicio HN 5 x/day with 325 ml water flush. This will provide 1250 ml, 2500 calories, 103 gm protein and 2500 ml free water (tube feeding/flush) per day. Can adjust feeding and flush depending on amount of PO intake/weight/labs.       Nutritionally Significant  Medications:  Synthroid, Senokot-S      Estimated Daily Nutrient Needs:  Energy Requirements Based On: Kcal/kg  Weight Used for Energy Requirements: Current  Energy (kcal/day): 2500 (35 kcal/kg)  Weight Used for Protein Requirements: Current  Protein (g/day):  (1.2-1.4g/kg)  Method Used for Fluid Requirements: 1 ml/kcal  Fluid (ml/day): 2500    Nutrition Related Findings:   Edema: None                      Last BM:  PTA    Wounds:   Wound Type: None      Current Nutrition Therapies:  Diet: NPO  Supplements: NPO  Meal Intake:   No data found.  Supplement Intake:  No data found.  Nutrition Support: none ordered      Anthropometric Measures:  Height: 185.4 cm (6' 0.99\")  Ideal Body Weight (IBW): 184 lbs (84 kg)    Admission Body Weight: 69.8 kg (153 lb 14.1 oz)  Current Body Weight: 71.4 kg (157 lb 6.5 oz), 85.5 % IBW. Weight Source: Bed scale  Current BMI (kg/m2): 20.8  Usual Body Weight: 81.6 kg (180 lb)  % Weight Change (Calculated): -12.6                    BMI Categories: Normal Weight (BMI 18.5-24.9)    Wt Readings from Last 10 Encounters:   05/15/25 71.4 kg (157 lb 6.5 oz)   01/13/25 78 kg (172 lb)   01/02/25 77.6 kg (171 lb)   11/04/24 78 kg (172 lb)   10/20/23 80.7 kg (178 lb)   05/15/23 79.9 kg (176 lb 3.2 oz)   01/30/23 79.9 kg (176 lb 3.2 oz)         Nutrition Diagnosis:   Inadequate oral intake related to catabolic illness, swallowing difficulty as evidenced by NPO or clear liquid status due to medical condition.    Nutrition Interventions:      Nutrition Education/Counseling: No recommendation at this time  Coordination of Nutrition Care: Continue to monitor while inpatient       Goals:     Goals: Initiation of nutrition, within 2 days       Nutrition Monitoring and Evaluation:   Behavioral-Environmental Outcomes: None Identified  Food/Nutrient Intake Outcomes: Progression of Nutrition  Physical Signs/Symptoms Outcomes: Biochemical Data, Weight, Chewing or Swallowing, GI Status    Discharge

## 2025-05-15 NOTE — PLAN OF CARE
Speech LAnguage Pathology TREATMENT    Patient: Erik Sanchez (45 y.o. male)  Date: 5/15/2025  Primary Diagnosis: Cancer of tonsil (HCC) [C09.9]  Squamous cell carcinoma of left tonsil (HCC) [C09.9]  Procedure(s) (LRB):  TRACHEOSTOMY, LARYNGOSCOPY AND BIOPSY OF LEFT TONSIL MASS (Left) 1 Day Post-Op   Precautions:                     ASSESSMENT :  Patient's wife seen for caregiver education as patient was in IR for port placement. Spent a lot of time discussing the swallow test that was going to be completed today. Also discussed the swallow mechanism, the trach's possible impact on the swallow mechanism. We then spent time discussing the importance of continuing SLP intervention during radiation, including the impacts of radiation to neck tissue and how that can impact the swallowing mechanism, particularly if the patient does not continue with exercises. She expressed understanding. This information was also discussed with patient at a later time, see separate MBS/PMV note.     Patient will benefit from skilled intervention to address the above impairments.     PLAN :  Recommendations and Planned Interventions:  Diet: See MBS note.         Acute SLP Services: Yes, SLP will continue to follow per plan of care.  Discharge Recommendations: Yes, recommend SLP treatment at next level of care     SUBJECTIVE:   Patient's wife Brianna seen for caregiver education.     OBJECTIVE:     Past Medical History:   Diagnosis Date    Bipolar II disorder, hypomanic episode, with seasonal pattern (HCC)     dx 1 year ago    GERD (gastroesophageal reflux disease) 2016    No longer an issue    Hypothyroidism 2023    Substance abuse ([facility])     Sober since January 2022     Past Surgical History:   Procedure Laterality Date    COLONOSCOPY  2015    IR PORT PLACEMENT > 5 YEARS  5/15/2025    IR PORT PLACEMENT > 5 YEARS 5/15/2025 [facility] RAD ANGIO IR    TRACHEOSTOMY Left 5/14/2025    TRACHEOSTOMY, LARYNGOSCOPY AND BIOPSY OF LEFT TONSIL MASS performed

## 2025-05-15 NOTE — PROGRESS NOTES
4 Eyes Skin Assessment     NAME:  Erik Sanchez  YOB: 1980  MEDICAL RECORD NUMBER:  [id number]    The patient is being assessed for  Post-Op Surgical    I agree that at least one RN has performed a thorough Head to Toe Skin Assessment on the patient. ALL assessment sites listed below have been assessed.      Areas assessed by both nurses:    Head, Face, Ears, Shoulders, Back, Chest, Arms, Elbows, Hands, Sacrum. Buttock, Coccyx, Ischium, Legs. Feet and Heels, Under Medical Devices , and Other N/A        Does the Patient have a Wound? No noted wound(s)       Hardeep Prevention initiated by RN: Yes  Wound Care Orders initiated by RN: No    Pressure Injury (Stage 3,4, Unstageable, DTI, NWPT, and Complex wounds) if present, place Wound referral order by RN under : No    New Ostomies, if present place, Ostomy referral order under : No     Nurse 1 eSignature: Electronically signed by Mehnaz Kee RN on 5/15/25 at 8:20 AM EDT    **SHARE this note so that the co-signing nurse can place an eSignature**    Nurse 2 eSignature: Electronically signed by Isabela Arnold RN on 5/15/25 at 11:29 PM EDT

## 2025-05-15 NOTE — PROGRESS NOTES
TRANSFER - OUT REPORT:    Verbal report given to ADRIO Hughes on Erik Sanchez  being transferred to ICU for routine progression of patient care       Report consisted of patient's Situation, Background, Assessment and   Recommendations(SBAR).     Information from the following report(s) Nurse Handoff Report, MAR, and Event Log was reviewed with the receiving nurse.           Lines:   Implantable Port 05/15/25 Right Subclavian (Active)   Port-a-Cath Status Not Accessed 05/15/25 1030   Site Assessment Clean, dry & intact 05/15/25 1030   Dressing Status Clean, dry & intact 05/15/25 1030       Peripheral IV 05/14/25 Left Hand (Active)   Site Assessment Clean, dry & intact 05/15/25 0800   Line Status Capped;Normal saline locked 05/15/25 0800   Line Care Connections checked and tightened 05/15/25 0800   Phlebitis Assessment No symptoms 05/15/25 0800   Infiltration Assessment 0 05/15/25 0800   Alcohol Cap Used Yes 05/15/25 0800   Dressing Status Clean, dry & intact 05/15/25 0800   Dressing Type Transparent 05/15/25 0800   Dressing Intervention New 05/14/25 0716       Peripheral IV 05/15/25 Left Antecubital (Active)   Site Assessment Clean, dry & intact 05/15/25 0800   Line Status Capped;Normal saline locked 05/15/25 0800   Line Care Connections checked and tightened 05/15/25 0800   Phlebitis Assessment No symptoms 05/15/25 0800   Infiltration Assessment 0 05/15/25 0800   Alcohol Cap Used Yes 05/15/25 0800   Dressing Status Clean, dry & intact 05/15/25 0800   Dressing Type Transparent 05/15/25 0800        Opportunity for questions and clarification was provided.      Patient transported with:  Crossfader

## 2025-05-15 NOTE — PROGRESS NOTES
[facility]  [address], [address] [facility] [address]  W: [phone]  F: [phone]    Reason for Evaluation:   Erik Sanchez is a 45 y.o. male who is seen in consultation at the request of Dr. Boswell for evaluation of squamous cell carcinoma of the left tonsil    Hematology/Oncology Treatment History:   5/14/2025: LARYNGOSCOPY AND BIOPSY OF LEFT TONSIL MASS, TRACHEOSTOMY   Frozen pathology: squamous cell carcinoma, finally pathology pending    History of Present Illness:   Erik Sanchez is a pleasant 45 y.o. male who presents today for evaluation of tonsillar squamous cell carcinoma.. Underwent laryngoscopy of biopsy of left tonsillar mass and tracheostomy with Dr. Boswell 5/14/2025.   Seen by his PCP in 1/2025 with sore throat, drainage, cough, and chest discomfort. Initially diagnosed with laryngitis, XR chest negative for acute process. CT Soft Tissue Neck W Contrast completed 5/7 revealing 7.9 cm x 3.4 cm x 3.1 cm heterogeneously  enhancing necrotic mass spanning from the left oropharynx through larynx, compatible with reported malignancy; associated moderate to severe narrowing of the airway at the level of the larynx. Also found to have Several necrotic bilateral level cervical lymph nodes measuring up to 1.8 cm in the left level 2 region.   Interval history  Better today.  Reveals that he has a history of alcohol    Review of systems was obtained and pertinent findings reviewed above. Past medical history, social history, family history, medications, and allergies are located in the electronic medical record.    Physical Exam:   /77   Pulse 60   Temp 97.9 °F (36.6 °C) (Oral)   Resp 13   Ht 1.854 m (6' 0.99\")   Wt 71.4 kg (157 lb 6.5 oz)   SpO2 100%   BMI 20.77 kg/m²   ECOG PS: 1  General: no distress  Eyes: anicteric sclerae  HENT:   Neck: Trach in place  Respiratory: normal respiratory effort, trach collar   CV: no peripheral edema, RRR  Ext: warm, well perfused,  no edema  GI: soft, nontender, nondistended, no masses  Skin: pale  Neuro: grossly intact    Results:     Lab Results   Component Value Date/Time    WBC 10.1 05/15/2025 03:13 AM    HGB 12.6 05/15/2025 03:13 AM    HCT 36.7 05/15/2025 03:13 AM     05/15/2025 03:13 AM    MCV 82.8 05/15/2025 03:13 AM     Lab Results   Component Value Date/Time     05/15/2025 03:13 AM    K 4.0 05/15/2025 03:13 AM     05/15/2025 03:13 AM    CO2 29 05/15/2025 03:13 AM    BUN 10 05/15/2025 03:13 AM     Lab Results   Component Value Date/Time    ALT 23 11/08/2024 07:59 AM    GLOB 2.9 11/08/2024 07:59 AM       Records from labs reviewed and summarized above.  Test results above have been reviewed.      Imaging:     CT Soft Tissue Neck W Contrast:  5/13/2025  Large left pharyngeal necrotic mass compatible with reported malignancy; associated narrowing at the level of the larynx. Necrotic cervical lymphadenopathy, compatible with metastatic disease.    CT chest 5/2025  IMPRESSION:  1. No findings of metastatic disease in the chest.  2. Incompletely evaluated lesions in the liver. Abdominal MRI with and without  contrast is recommended for further characterization to exclude metastatic  disease.  3. Partially visualized left central mass with extensive subcutaneous emphysema  extending from the neck into the right axilla and mediastinum.         Assessment:   1) squamous cell carcinoma of the left tonsil-HPV status pending  -Noted to have 7.9 cm x 3.4 cm x 3.1 cm heterogeneously  enhancing necrotic mass in the left pharynx.  Has bilateral cervical lymphadenopathy  Needed tracheostomy for airway protection  -Needs an outpatient PET scan for staging purposes   -At least [id number] SCCa left tonsil -pending HPV status staging to be readdressed  - If this is locally advanced disease with nonbulky adenopathy then discussed tentative plans for chemoradiation once pathology is confirmed    Will also refer outpatient for evaluation at

## 2025-05-15 NOTE — PROGRESS NOTES
Erik Sanchez is a 45 y.o. male POD1 from tracheostomy and DL/bx for [id number] SCCa left tonsil.  No difficulties with ventilation.  Pain controlled.  CT chest today today    /79   Pulse 68   Temp 98.1 °F (36.7 °C) (Oral)   Resp 12   Ht 1.854 m (6' 1\")   Wt 71.4 kg (157 lb 6.5 oz)   SpO2 97%   BMI 20.77 kg/m²    NAD  Trach well seated with silk sutures in place.  Dressing changed.      A/P:  Doing well on POD1.    -Continue routine trach care per nursing staff and RT.  Will plan to change to 6 cuffless late this afternoon.  He will go home with this trach.  Start discharge planning today.  I have consulted RT, SLP and case management to arrange for home health needs and equipment.  -f/u CT chest read  -port placement today  -MBS today and SLP f/u assessment.  Nutrition/Dietician consulted.  May need GI consult for PEG pending findings.  -Dr. Rowe and Dr. Amato to see as outpatient again.  PET ordered by Dr. Rowe.  -Will likely be treated with definitive chemoXRT although I have initiated referral as outpatient to [facility] to see if pharyngectomy, neck dissection, free flap recon is preferable.    -transfer to floor today.      Dae Boswell MD  [facility]   [address], [address]  [place] [address]   (c) [phone]  (o) [phone]  (f) [phone]

## 2025-05-15 NOTE — PROGRESS NOTES
CRITICAL CARE  PROGRESS NOTE      Name: Erik Sanchez   : 1980   MRN: [id number]   Date: 5/15/2025      REASON FOR ICU ADMISSION:  Post-Op ICU Management      BRIEF PATIENT SUMMARY AND HOSPITAL COURSE   Erik Sanchez is a 46 y/o male w/ hx of hypothyroidism who presented to outpatient ENT clinic with chief complaint of worsening sore throat and dysphagia for the past 5-6 months. He was found to have large left lateral pharyngeal wall/tonsillar mass and lymphadenopathy concerning for squamous cell carcinoma. He reports associated symptoms of recent shortness of breath and weight loss which he attributes to dysphagia. Presented today for scheduled biopsy and tracheostomy by ENT Dr. Boswell. Transferred to ICU for post-operative observation and management. Patient doing well at this time. Reports a mild neck soreness at trach site but otherwise well.     5/15: No overnight events. Doing well on trach collar. ENT planning for trach downsize and transfer out of ICU today. Intensivist team will sign off at this time, however please do not hesitate to call with any questions, concerns, or change in clinical status.     COMPREHENSIVE ASSESSMENT & PLAN     Left tonsillar mass  - ENT following   - status post laryngoscopy and biopsy of left tonsil mass + tracheostomy on  by Dr. Boswell  - ENT planning for trach downsize to 6 cuffless this afternoon  - concerning for squamous cell carcinoma, follow up final pathology   - continue trach collar as tolerated  - CT chest pending, outpatient PET scan ordered   - Oncology, Rad/Onc following   - Dietician consulted, may need PEG placement   - Pain control, aspiration precautions   - SLP consulted  - Plan for Port placement today       Hypothyroidism  - home synthroid      SUBJECTIVE     No overnight events. Doing well today, states he had a good night and denies complaints currently.         OBJECTIVE   /77   Pulse 60   Temp 98.1 °F (36.7 °C) (Oral)   Resp 13  There is no pneumothorax. The visualized  bones and upper abdomen are age-appropriate.    Impression  No acute process.      Electronically signed by Jax Hope    Most Recent CT  CT SOFT TISSUE NECK W CONTRAST 05/13/2025    Addendum 5/13/2025 11:19 AM  Addendum: 1.  Findings related to neck mass and airway narrowing were discussed  with Dr. Boswell by the CT technologist at 1100 hours on 5/13/2025. The patient  has a follow-up with ENT tomorrow and the ordering provider deemed this finding  nonemergent. The patient was doing well clinically at the time of scan as well.  2.  Few patchy tree-in-bud and groundglass opacities in the right upper lobe  could reflect atypical infection or inflammation. Follow-up chest CT in 3 months  can be obtained to document resolution.    23X          Electronically signed by SHEELA BARILLAS    Narrative  INDICATION: Malignant neoplasm of tonsillar fossa (HCC) Malignant neoplasm of  tonsillar fossa (HCC) / Additional Contrast?->None    COMPARISON: None    TECHNIQUE:  Axial CT neck following administration of IV contrast. Sagittal and  coronal reformats were generated.    CT dose reduction was achieved through use of a standardized protocol tailored  for this examination and automatic exposure control for dose modulation.    CONTRAST: 100 mL Isovue 300    FINDINGS:    NASOPHARYNX: Normal.  SUPRAHYOID AND INFRAHYOID NECK: 7.9 cm x 3.4 cm x 3.1 cm heterogeneously  enhancing necrotic mass spanning from the left oropharynx through larynx,  compatible with reported malignancy; associated moderate to severe narrowing of  the airway at the level of the larynx.  PAROTID GLANDS: Normal.  SUBMANDIBULAR GLANDS: Normal.  THYROID GLAND: Normal. No nodule.  LYMPH NODES: Several necrotic bilateral level cervical lymph nodes measuring up  to 1.8 cm in the left level 2 region (302-78).  LUNG APICES: Clear.  OSSEOUS STRUCTURES: No destructive bone lesion.  ADDITIONAL COMMENTS:

## 2025-05-15 NOTE — PROGRESS NOTES
SPEECH PATHOLOGY MODIFIED BARIUM SWALLOW STUDY AND PMV TREATMENT  Patient: Erik Sanchez (45 y.o. 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 Patient tolerating PMV without difficulty and with excellent voicing. O2 and RR remained stable throughout session, and no evidence of back pressure/breath stacking when PMV doffed during MBS. Recommend continue PMV during all waking hours as tolerated.     MBS demonstrated a functional oral phase of swallow and a mildly disordered pharyngeal swallow. Pharyngeal dysphagia is due to large tonsillar mass impacting pharyngeal constriction with reduced base of tongue retraction, reduced hyolaryngeal excursion, limited to no epiglottic movement and subsequent pharyngeal stripping wave. These deficits result in moderate residual in the valleculae, piriform sinus, posterior pharyngeal wall, and, with solids, demonstrates nasopharyngeal regurgitation. After trial of solid consistency with a liquid wash, patient's piriform sinus residue resulted in penetration to the level of the vocal folds x1, to which patient was sensate and independently cleared airway. Reduced movement of bolus through the UES is also present likely due to reduced pharyngeal constriction. There was no significant difference in presentation with and without PMV donned. In AP view, it was noted that the piriform sinus residue was almost exclusively in the patient's right piriform sinus, likely due to mass effect from tonsillar mass.    At this juncture, although pt does have disordered swallow mechanism, he is appropriate to resume baseline diet for comfort. Therefore, recommend regular diet/thin liquids with patient self-selecting softer foods, and with  with speaking valve donned. Do agree with proceeding with feeding tube placement, as pt with reduced intake prior to admission, and would be concerned about having adequate nutrition during radiation. This was all discussed with the patient who expressed appreciation and understanding. Will continue to follow.    Patient will benefit from skilled intervention to address the above impairments.      Residue present with thin liquids and mildly-thick liquids.     Piriform sinus residue collecting mostly on the right    Solid consistency stuck around the tonsillar mass with escape to nasopharynx  PLAN :  Recommendations and Planned Interventions:  Diet: Regular and thin liquids for comfort; patient to get feeding tube for supplemental nutrition  PMV donned with all PO and during all waking hours as tolerated  Vigilant oral care  Meds in liquid form would be ideal until feeding tube placed for full efficacy.  Patient to self-select softer foods.         Acute SLP Services: Yes, patient will be followed by speech-language pathology 4x/week to address goals. Patient's rehabilitation potential is considered to be Good.  Discharge Recommendations: Yes, recommend SLP treatment at next level of care     SUBJECTIVE:   Patient stated “Yes, that sounds reasonable.”    OBJECTIVE:     Past Medical History:   Diagnosis Date    Bipolar II disorder, hypomanic episode, with seasonal pattern (HCC)     dx 1 year ago    GERD (gastroesophageal reflux disease) 2016    No longer an issue    Hypothyroidism 2023    Substance abuse (HCC)     Sober since January 2022     Past Surgical History:   Procedure Laterality Date    COLONOSCOPY  2015    IR PORT PLACEMENT > 5 YEARS  5/15/2025    IR PORT PLACEMENT > 5 YEARS 5/15/2025 [facility] RAD ANGIO IR    TRACHEOSTOMY Left 5/14/2025    TRACHEOSTOMY, LARYNGOSCOPY AND BIOPSY OF LEFT TONSIL MASS performed by Dae Boswell MD at [facility] MAIN OR    WISDOM TOOTH EXTRACTION  1996       Diet prior to admission:  2--Tongue Control During Bolus Hold: 0=Cohesive bolus between tongue to palatal seal  Component 3--Bolus Preparation/Mastication: 0=Timely and efficient chewing and mashing  Component 4--Bolus Transport/Lingual Motion: 0=Brisk tongue motion  Component 5--Oral Residue: 0=Complete oral clearance  Component 6--Initiation of Pharyngeal Swallow: 1=Bolus head in valleculae    PHARYNGEAL Impairment  Component 7--Soft Palate Elevation: 2=Escape to nasopharynx  Component 8--Laryngeal Elevation: 2=Minimal superior movement of thyroid cartilage with minimal approximation of arytenoids to epiglottic petiole  Component 9--Anterior Hyoid Excursion: 1=Partial anterior movement  Component 10--Epiglottic Movement: 2=No inversion  Component 11--Laryngeal Vestibular Closure: 1=Incomplete; narrow column air/contrast in laryngeal vestibule  Component 12--Pharyngeal Stripping Wave: 1=Present - diminished  Component 13--Pharyngeal Contraction: 2=Unilateral bulging (likely due to tonsillar mass)  Component 14--Pharyngoesophageal Segment Openin=Partial distension/partial duration; partial obstruction of flow  Component 15--Tongue Base Retraction: 3=Wide column of contrast or air between tongue base and pharyngeal wall  Component 16--Pharyngeal Residue: 2=Collection of residue within or on pharyngeal structures    ESOPHAGEAL Impairment  Component 17--Esophageal Clearance Upright Position: 0=Complete clearance; esophageal coating        Functional Oral Intake Scale (FOIS): 6--Total oral diet with multiple consistencies without special preparation, but with specific food limitations    COMMUNICATION/EDUCATION:     The patient's plan of care including recommendations, planned interventions, and recommended diet changes were discussed with: Registered nurse    Patient/family have participated as able in goal setting and plan of care    Concepcion Sebastian, Ph.D., CCC-SLP (pronouns: she/her/hers)  Speech-Language Pathologist

## 2025-05-15 NOTE — PROGRESS NOTES
POD 1 tracheostomy.    Procedure:  The patient was reclined in a supine position.  After ensuring all equipment was available, the sutures were removed from the tracheostomy and the Velcro tie was removed.  The appliance was removed from the wound and the wound was examined and cleaned of mucus.  Pramod flap in good position.  I carefully placed a 6 cuffless Shiley and secured it around his neck with a Velcro tie.  I placed a clean dressing around the tracheostomy.  He was able to vocalize and breathe normally with a Passy-Anderson valve.  He tolerated the procedure well.      Tracheostomy tube exchanged from 6 cuffed to 6 cuffless shiley.  Tract appears appropriate.  Pramod flap in good shape.  Clean dressing applied.  Improved voice with PMV.      G tube tomorrow  MRI overnight likely  Referral placed for outpatient f/u with Dr. Chu.  Ensure to supplement meals.  Will check phosphorus with daily labs.      Continue trach teaching.  Will need home supplies for trach and PEG.  Hopeful for discharge home this weekend if able.    Dae Boswell MD  [facility]  [address], [address]  [place] [address]   (o) [phone]  (f) [phone]   Cell: [phone]

## 2025-05-15 NOTE — PROGRESS NOTES
Shift Summary    1115: Received report from DARIO Hughes. Received call from MD Elbert, stating radiology noted incidental finding on patient's CT ABD this AM. Order placed for MRI ABD. Screening form completed with patient.     1145: Order for IR Fluoro study for GJ tube placement for nutritional and medication administration by JOSE Estevez. Received a call from Angio, unable to complete order today d/t patient already receiving contrast from today's delgado cath placement procedure this morning. Order to have patient NPO at midnight with the plan of completing the procedure tomorrow.     1315: Report and transition of care given to DARIO Juarez

## 2025-05-16 ENCOUNTER — HOSPITAL ENCOUNTER (INPATIENT)
Facility: HOSPITAL | Age: 45
Discharge: HOME | DRG: 097 | End: 2025-05-19
Attending: OTOLARYNGOLOGY
Payer: COMMERCIAL

## 2025-05-16 VITALS
RESPIRATION RATE: 20 BRPM | DIASTOLIC BLOOD PRESSURE: 74 MMHG | HEART RATE: 85 BPM | TEMPERATURE: 98.3 F | SYSTOLIC BLOOD PRESSURE: 119 MMHG | OXYGEN SATURATION: 96 %

## 2025-05-16 DIAGNOSIS — C09.9 TONSILLAR CANCER (HCC): Primary | ICD-10-CM

## 2025-05-16 LAB
AFP-TM SERPL-MCNC: <1.8 NG/ML (ref 0–6.9)
ANION GAP SERPL CALC-SCNC: 4 MMOL/L (ref 2–12)
BUN SERPL-MCNC: 12 MG/DL (ref 6–20)
BUN/CREAT SERPL: 20 (ref 12–20)
CALCIUM SERPL-MCNC: 8.4 MG/DL (ref 8.5–10.1)
CHLORIDE SERPL-SCNC: 102 MMOL/L (ref 97–108)
CO2 SERPL-SCNC: 28 MMOL/L (ref 21–32)
CREAT SERPL-MCNC: 0.6 MG/DL (ref 0.7–1.3)
ERYTHROCYTE [DISTWIDTH] IN BLOOD BY AUTOMATED COUNT: 12.5 % (ref 11.5–14.5)
GLUCOSE SERPL-MCNC: 97 MG/DL (ref 65–100)
HCT VFR BLD AUTO: 35.8 % (ref 36.6–50.3)
HGB BLD-MCNC: 11.9 G/DL (ref 12.1–17)
MAGNESIUM SERPL-MCNC: 1.9 MG/DL (ref 1.6–2.4)
MCH RBC QN AUTO: 28.5 PG (ref 26–34)
MCHC RBC AUTO-ENTMCNC: 33.2 G/DL (ref 30–36.5)
MCV RBC AUTO: 85.9 FL (ref 80–99)
NRBC # BLD: 0 K/UL (ref 0–0.01)
NRBC BLD-RTO: 0 PER 100 WBC
PHOSPHATE SERPL-MCNC: 3.3 MG/DL (ref 2.6–4.7)
PLATELET # BLD AUTO: 142 K/UL (ref 150–400)
PMV BLD AUTO: 9.6 FL (ref 8.9–12.9)
POTASSIUM SERPL-SCNC: 3.7 MMOL/L (ref 3.5–5.1)
RBC # BLD AUTO: 4.17 M/UL (ref 4.1–5.7)
SODIUM SERPL-SCNC: 134 MMOL/L (ref 136–145)
WBC # BLD AUTO: 6.6 K/UL (ref 4.1–11.1)

## 2025-05-16 PROCEDURE — 92526 ORAL FUNCTION THERAPY: CPT

## 2025-05-16 PROCEDURE — 6370000000 HC RX 637 (ALT 250 FOR IP): Performed by: OTOLARYNGOLOGY

## 2025-05-16 PROCEDURE — 84100 ASSAY OF PHOSPHORUS: CPT

## 2025-05-16 PROCEDURE — 85027 COMPLETE CBC AUTOMATED: CPT

## 2025-05-16 PROCEDURE — 92507 TX SP LANG VOICE COMM INDIV: CPT

## 2025-05-16 PROCEDURE — 6360000004 HC RX CONTRAST MEDICATION: Performed by: INTERNAL MEDICINE

## 2025-05-16 PROCEDURE — 2709999900 MRI ABDOMEN W WO CONTRAST

## 2025-05-16 PROCEDURE — 99233 SBSQ HOSP IP/OBS HIGH 50: CPT | Performed by: INTERNAL MEDICINE

## 2025-05-16 PROCEDURE — 2580000003 HC RX 258: Performed by: INTERNAL MEDICINE

## 2025-05-16 PROCEDURE — A9579 GAD-BASE MR CONTRAST NOS,1ML: HCPCS | Performed by: INTERNAL MEDICINE

## 2025-05-16 PROCEDURE — 2500000003 HC RX 250 WO HCPCS: Performed by: STUDENT IN AN ORGANIZED HEALTH CARE EDUCATION/TRAINING PROGRAM

## 2025-05-16 PROCEDURE — 2060000000 HC ICU INTERMEDIATE R&B

## 2025-05-16 PROCEDURE — C1769 GUIDE WIRE: HCPCS

## 2025-05-16 PROCEDURE — 80048 BASIC METABOLIC PNL TOTAL CA: CPT

## 2025-05-16 PROCEDURE — 83735 ASSAY OF MAGNESIUM: CPT

## 2025-05-16 PROCEDURE — 6360000002 HC RX W HCPCS: Performed by: STUDENT IN AN ORGANIZED HEALTH CARE EDUCATION/TRAINING PROGRAM

## 2025-05-16 PROCEDURE — 94760 N-INVAS EAR/PLS OXIMETRY 1: CPT

## 2025-05-16 PROCEDURE — 2500000003 HC RX 250 WO HCPCS: Performed by: OTOLARYNGOLOGY

## 2025-05-16 PROCEDURE — 6360000002 HC RX W HCPCS: Performed by: OTOLARYNGOLOGY

## 2025-05-16 PROCEDURE — 2700000000 HC OXYGEN THERAPY PER DAY

## 2025-05-16 RX ORDER — OXYCODONE HYDROCHLORIDE 5 MG/1
5 TABLET ORAL EVERY 4 HOURS PRN
Qty: 18 TABLET | Refills: 0 | Status: SHIPPED | OUTPATIENT
Start: 2025-05-16 | End: 2025-05-19

## 2025-05-16 RX ORDER — FENTANYL CITRATE 50 UG/ML
INJECTION, SOLUTION INTRAMUSCULAR; INTRAVENOUS PRN
Status: COMPLETED | OUTPATIENT
Start: 2025-05-16 | End: 2025-05-16

## 2025-05-16 RX ORDER — SODIUM CHLORIDE 0.9 % (FLUSH) 0.9 %
5-40 SYRINGE (ML) INJECTION 2 TIMES DAILY
Status: DISCONTINUED | OUTPATIENT
Start: 2025-05-16 | End: 2025-05-20 | Stop reason: HOSPADM

## 2025-05-16 RX ORDER — GADOTERIDOL 279.3 MG/ML
14 INJECTION INTRAVENOUS
Status: COMPLETED | OUTPATIENT
Start: 2025-05-16 | End: 2025-05-16

## 2025-05-16 RX ORDER — LIDOCAINE HYDROCHLORIDE 10 MG/ML
INJECTION, SOLUTION EPIDURAL; INFILTRATION; INTRACAUDAL; PERINEURAL PRN
Status: COMPLETED | OUTPATIENT
Start: 2025-05-16 | End: 2025-05-16

## 2025-05-16 RX ORDER — SENNA AND DOCUSATE SODIUM 50; 8.6 MG/1; MG/1
1 TABLET, FILM COATED ORAL 2 TIMES DAILY
Qty: 20 TABLET | Refills: 0 | Status: SHIPPED | OUTPATIENT
Start: 2025-05-16

## 2025-05-16 RX ORDER — MIDAZOLAM HYDROCHLORIDE 2 MG/2ML
INJECTION, SOLUTION INTRAMUSCULAR; INTRAVENOUS PRN
Status: COMPLETED | OUTPATIENT
Start: 2025-05-16 | End: 2025-05-16

## 2025-05-16 RX ORDER — 0.9 % SODIUM CHLORIDE 0.9 %
100 INTRAVENOUS SOLUTION INTRAVENOUS ONCE
Status: COMPLETED | OUTPATIENT
Start: 2025-05-16 | End: 2025-05-16

## 2025-05-16 RX ADMIN — IBUPROFEN 600 MG: 400 TABLET, FILM COATED ORAL at 23:05

## 2025-05-16 RX ADMIN — WATER 2000 MG: 1 INJECTION INTRAMUSCULAR; INTRAVENOUS; SUBCUTANEOUS at 10:45

## 2025-05-16 RX ADMIN — SODIUM CHLORIDE, PRESERVATIVE FREE 10 ML: 5 INJECTION INTRAVENOUS at 20:39

## 2025-05-16 RX ADMIN — MIDAZOLAM HYDROCHLORIDE 1 MG: 1 INJECTION, SOLUTION INTRAMUSCULAR; INTRAVENOUS at 11:10

## 2025-05-16 RX ADMIN — OXYCODONE 5 MG: 5 TABLET ORAL at 08:56

## 2025-05-16 RX ADMIN — SODIUM CHLORIDE 100 ML: 900 INJECTION, SOLUTION INTRAVENOUS at 01:46

## 2025-05-16 RX ADMIN — GADOTERIDOL 14 ML: 279.3 INJECTION, SOLUTION INTRAVENOUS at 01:46

## 2025-05-16 RX ADMIN — SENNOSIDES AND DOCUSATE SODIUM 1 TABLET: 50; 8.6 TABLET ORAL at 20:39

## 2025-05-16 RX ADMIN — FENTANYL CITRATE 50 MCG: 50 INJECTION INTRAMUSCULAR; INTRAVENOUS at 11:16

## 2025-05-16 RX ADMIN — OXYCODONE 5 MG: 5 TABLET ORAL at 20:39

## 2025-05-16 RX ADMIN — MORPHINE SULFATE 2 MG: 2 INJECTION, SOLUTION INTRAMUSCULAR; INTRAVENOUS at 00:45

## 2025-05-16 RX ADMIN — LEVOTHYROXINE SODIUM 37.5 MCG: 0.07 TABLET ORAL at 08:56

## 2025-05-16 RX ADMIN — LIDOCAINE HYDROCHLORIDE 20 ML: 10 INJECTION, SOLUTION EPIDURAL; INFILTRATION; INTRACAUDAL; PERINEURAL at 11:19

## 2025-05-16 RX ADMIN — SENNOSIDES AND DOCUSATE SODIUM 1 TABLET: 50; 8.6 TABLET ORAL at 08:56

## 2025-05-16 RX ADMIN — GLUCAGON 1 MG: KIT at 11:23

## 2025-05-16 RX ADMIN — FENTANYL CITRATE 50 MCG: 50 INJECTION INTRAMUSCULAR; INTRAVENOUS at 11:10

## 2025-05-16 RX ADMIN — IBUPROFEN 600 MG: 400 TABLET, FILM COATED ORAL at 15:16

## 2025-05-16 RX ADMIN — MIDAZOLAM HYDROCHLORIDE 1 MG: 1 INJECTION, SOLUTION INTRAMUSCULAR; INTRAVENOUS at 11:16

## 2025-05-16 RX ADMIN — OXYCODONE 5 MG: 5 TABLET ORAL at 13:40

## 2025-05-16 ASSESSMENT — PAIN DESCRIPTION - ONSET
ONSET: ON-GOING

## 2025-05-16 ASSESSMENT — PAIN DESCRIPTION - PAIN TYPE
TYPE: SURGICAL PAIN

## 2025-05-16 ASSESSMENT — PAIN SCALES - GENERAL
PAINLEVEL_OUTOF10: 3
PAINLEVEL_OUTOF10: 7
PAINLEVEL_OUTOF10: 6
PAINLEVEL_OUTOF10: 3
PAINLEVEL_OUTOF10: 6

## 2025-05-16 ASSESSMENT — PAIN DESCRIPTION - LOCATION
LOCATION: ABDOMEN
LOCATION: ABDOMEN
LOCATION: NECK
LOCATION: ABDOMEN;THROAT
LOCATION: ABDOMEN
LOCATION: ABDOMEN

## 2025-05-16 ASSESSMENT — PAIN - FUNCTIONAL ASSESSMENT
PAIN_FUNCTIONAL_ASSESSMENT: PREVENTS OR INTERFERES SOME ACTIVE ACTIVITIES AND ADLS
PAIN_FUNCTIONAL_ASSESSMENT: PREVENTS OR INTERFERES SOME ACTIVE ACTIVITIES AND ADLS
PAIN_FUNCTIONAL_ASSESSMENT: ACTIVITIES ARE NOT PREVENTED

## 2025-05-16 ASSESSMENT — PAIN DESCRIPTION - DESCRIPTORS
DESCRIPTORS: ACHING
DESCRIPTORS: ACHING;PENETRATING
DESCRIPTORS: ACHING
DESCRIPTORS: ACHING;SORE

## 2025-05-16 ASSESSMENT — PAIN DESCRIPTION - FREQUENCY
FREQUENCY: INTERMITTENT

## 2025-05-16 ASSESSMENT — PAIN DESCRIPTION - ORIENTATION
ORIENTATION: MID;LEFT
ORIENTATION: MID;LEFT
ORIENTATION: ANTERIOR
ORIENTATION: MID;LEFT

## 2025-05-16 NOTE — INTERDISCIPLINARY ROUNDS
Interdisciplinary Trach Team Rounds    Patient discussed in interdisciplinary trach team rounds on this date. Present for rounds: Ellen Roman, Interdisciplinary Tracheostomy , Speech-Language Pathologist, Fanta Navarrete, Respiratory Therapy Clinical Educator, Dr. Wm Samayoa, Intensivist, and Dr. Ariel Fermin ENT    Trach Info:   Trach Size: 6   Trach Type: Shiley   Cuffed/Cuffless:  Cuffed   Placing Surgeon: Dr. Boswell (ENT)   Placement Date 5/14/25   Trach Change: n/a   Reason for Trach: Tonsillar cancer     Airway:  Surgical Airway  05/15/25 Uncuffed (Active)   Status Secured;Speaking valve 05/16/25 0800   Site Assessment Clean;Dry;No Bleeding;No drainage 05/16/25 0800   Site Care Protective barrier to skin 05/16/25 0800   Ties Assessment Secure 05/16/25 0800   Spare Trach at Bedside Yes 05/16/25 0800   Spare Trach Tube One Size Smaller at Bedside Yes 05/16/25 0800   Ambu Bag With Mask at Bedside Yes 05/16/25 0800     Surgical Airway  05/15/25 Uncuffed (Active)   Placement Date/Time: 05/15/25 1700   Placed By: Licensed provider  Surgical Airway Type: Tracheostomy  Style: Uncuffed  Size: 6       Oxygen Therapy:  Oxygen Therapy  SpO2: 98 %  Pulse Oximetry Type: Continuous  Pulse via Oximetry: 90 beats per minute  SPO2 High Alarm Limit: 100  SPO2 Low Alarm Limit POX: 88  Pulse Oximeter Device Mode: Continuous  Pulse Oximeter Device Location: Right;Finger  O2 Device: Trach collar  Oximetry Probe Site Changed: No  Skin Assessment: Clean, dry, & intact  Skin Protection for O2 Device: Yes  Orientation: Anterior  Location: Neck  Intervention(s): Reposition Device  FiO2 : 35 %  O2 Flow Rate (L/min): 7 L/min  Oxygen Therapy: Supplemental oxygen  Vent Settings  FiO2 : 35 %    Discussed:  [facility] [place] this [place]. Order is in for a GJ tube. Tolerating PMV for 30 minutes on initial evaluation.     Discharge Plan: Home    Trach Suction Package:?         -Suction machine, standard- ?          -Tracheostomy Suction Catheter (8 FR)- ?         -Suction Tubing for Suction Machine- ?         -Disposable Suction Canister ()?         -Yankauer Suction Catheter- ?         -Tracheostomy Care Kit- ?         -Tracheostomy Ties?         -Disposable inner cannulas: Shiley™ disposable inner cannula with an integral 15 mm snap-lock connector size: 6         -HMEs?         -Oxygen?   Follow-up OP/HH SLP/PT/OT?   Follow-up ENT    Interdisciplinary Tracheostomy Team will continue to follow while patient is in-house.      Thank you,  Ellen Roman, Interdisciplinary Tracheostomy , Speech-Language Pathologist

## 2025-05-16 NOTE — PROCEDURES
Interventional Radiology  Procedure Note        5/16/2025 3:59 PM    Patient: Erik Sanchez     Informed consent obtained    Diagnosis: head/neck cancer    Procedure(s): percutaneous gastrostomy tube placement    Estimated blood loss: less than 5cc    Anesthesia: Moderate sedation    Specimens removed:  none    Complications: None    Implants: None    Primary Physician: Sanjiv Jamison MD    Recommendations: OK to use tube immediately for water, meds. Wait 24hr for feeds.    Full dictated report to follow    Sanjiv Jamison MD  Interventional Radiology  [facility]  3:59 PM, 5/16/2025

## 2025-05-16 NOTE — PROGRESS NOTES
TRANSFER - OUT REPORT:    Verbal report given to DARIO Monique on Erik Sanchez  being transferred to ICU for routine post-op       Report consisted of patient's Situation, Background, Assessment and   Recommendations(SBAR).     Information from the following report(s) Nurse Handoff Report and Surgery Report was reviewed with the receiving nurse.           Lines:   Implantable Port 05/15/25 Right Subclavian (Active)   Port-a-Cath Status De-Accessed 05/16/25 0400   Site Assessment Clean, dry & intact 05/16/25 0400   Dressing Status Clean, dry & intact 05/16/25 0400       Peripheral IV 05/14/25 Left Hand (Active)   Site Assessment Clean, dry & intact 05/16/25 0400   Line Status Capped;Normal saline locked 05/16/25 0400   Line Care Connections checked and tightened 05/16/25 0400   Phlebitis Assessment No symptoms 05/16/25 0400   Infiltration Assessment 0 05/16/25 0400   Alcohol Cap Used Yes 05/16/25 0400   Dressing Status Clean, dry & intact 05/16/25 0400   Dressing Type Transparent 05/16/25 0400   Dressing Intervention New 05/14/25 0716       Peripheral IV 05/15/25 Left Antecubital (Active)   Site Assessment Clean, dry & intact 05/16/25 0400   Line Status Capped;Normal saline locked 05/16/25 0400   Line Care Connections checked and tightened 05/16/25 0400   Phlebitis Assessment No symptoms 05/16/25 0400   Infiltration Assessment 0 05/16/25 0400   Alcohol Cap Used Yes 05/16/25 0400   Dressing Status Clean, dry & intact 05/16/25 0400   Dressing Type Transparent 05/16/25 0400        Opportunity for questions and clarification was provided.      Patient transported with:  Monitor, O2 @ 3lpm, and Registered Nurse

## 2025-05-16 NOTE — PROGRESS NOTES
[facility]  [address], [address] [facility] [address]  W: [phone]  F: [phone]    Reason for Evaluation:   Erik Sanchez is a 45 y.o. male who is seen in consultation at the request of Dr. Boswell for evaluation of squamous cell carcinoma of the left tonsil    Hematology/Oncology Treatment History:   5/14/2025: LARYNGOSCOPY AND BIOPSY OF LEFT TONSIL MASS, TRACHEOSTOMY   [place] pathology: squamous cell carcinoma, finally pathology pending    History of Present Illness:   Erik Sanchez is a pleasant 45 y.o. male who presents today for evaluation of tonsillar squamous cell carcinoma.. Underwent laryngoscopy of biopsy of left tonsillar mass and tracheostomy with Dr. Boswell 5/14/2025.   Seen by his PCP in 1/2025 with sore throat, drainage, cough, and chest discomfort. Initially diagnosed with laryngitis, XR chest negative for acute process. CT Soft Tissue Neck W Contrast completed 5/7 revealing 7.9 cm x 3.4 cm x 3.1 cm heterogeneously  enhancing necrotic mass spanning from the left oropharynx through larynx, compatible with reported malignancy; associated moderate to severe narrowing of the airway at the level of the larynx. Also found to have Several necrotic bilateral level cervical lymph nodes measuring up to 1.8 cm in the left level 2 region.   Interval history  Better today.  Reveals that he has a history of alcohol    Review of systems was obtained and pertinent findings reviewed above. Past medical history, social history, family history, medications, and allergies are located in the electronic medical record.    Physical Exam:   /89   Pulse 85   Temp 98.5 °F (36.9 °C) (Oral)   Resp 19   Ht 1.854 m (6' 0.99\")   Wt 71.8 kg (158 lb 4.6 oz)   SpO2 98%   BMI 20.89 kg/m²   ECOG PS: 1  General: no distress  Eyes: anicteric sclerae  HENT:   Neck: Trach in place  Respiratory: normal respiratory effort, trach collar   CV: no peripheral edema, RRR  Ext: warm, well perfused, no  ENT surgery no both radiation oncology and us agree that in the event of surgical resection he has a very high likelihood of then needing postop chemoradiation as I suspect his margins are unlikely to be negative      2) Tracheostomy   -Placed 5/14/2025    3) dysphagia  PEG tube    4) liver lesion  MRI was obtained and shows benign hemangiomas.  Plan:     IR Port placement 5/15/2025  PEG tube  Outpatient PET ordered  RD following.  Radiation oncology following.  Case was discussed with [facility] and they agree with chemoradiation.  Patient and family have decided to seek care at [facility]    I appreciate the opportunity to participate in Mr. Erik Sanchez's care.       Signed By: Estella Rowe MD

## 2025-05-16 NOTE — PROGRESS NOTES
Erik Sanchez is a 45 y.o. male POD2 from tracheostomy and DL/bx for [id number] SCCa left tonsil.     Tracheostomy exchanged yesterday.  Received central line.  Modified barium swallow and speech pathology evaluation completed.  Cleared to resume diet.  Appreciate dietitian recommendations and have added supplementation.  MRI abdomen completed overnight and read pending.  Planning for G-tube today.  I discussed this case with Dr. Taylor who does not recommend additional surgery but to treat with definitive chemoradiation.Mr. Sanchez   Reports that pain is controlled.    /71   Pulse 58   Temp 98.4 °F (36.9 °C) (Oral)   Resp 10   Ht 1.854 m (6' 0.99\")   Wt 71.8 kg (158 lb 4.6 oz)   SpO2 98%   BMI 20.89 kg/m²    NAD  Trach well seated     Data review:  CT chest: chest clear, concerning lesion in liver  MRI abdomen: results pending  MBS/SLP eval: safe to continue oral diet, rec G tube as likely will worsen during treatment  Dietician note: supplement with ensure  Med onc note: plan for outpatient PET.  Has received central line  Rad onc note: plan for XRT, consider [facility] surgical referral    Lab Results   Component Value Date    CALCIUM 8.4 (L) 05/16/2025    PHOS 3.3 05/16/2025            A/P:  Doing well on POD2.    -Continue routine trach care per nursing staff and RT.  He will go home with this trach.    -I have consulted RT, SLP and case management to arrange for home health needs and equipment (tracheostomy and G tube)  -f/u MRI read  -monitor for refeeding.  Phos normal today  - G tube today  -Dr. Rowe and Dr. Amato to see as outpatient again.  PET ordered by Dr. Rowe.  -Dr. Taylor ([facility] otolaryngology) reviewed CT neck and discussed his case.  He recommends definitive chemoXRT with surgery only for salvage if needed post treatment.  -transfer to floor today.  -hopeful discharge over the weekend      Dae Boswell MD  [facility]   [address], [address]   [place] [address]   (c) [phone]  (o) [phone]  (f) [phone]

## 2025-05-16 NOTE — PROGRESS NOTES
Brief Oncology Consult:    Discussed case with Dr. Rowe and Dr. Amato.    Met with Mr. Sanchez and his wife Tatum at bedside. After further review, they both would like to pursue care with [facility].     Urgent referral placed to Dr. BRAND for assessment and treatment of Mr. Sanchez's squamous cell carcinoma of the left tonsil-HPV status pending.    I appreciate the opportunity to participate in Mr. Erik Sanchez's care.     Zenaida GAGNON-BC, NP      [facility] [address], [address] [place] [address]  W: [phone]  F: [phone]

## 2025-05-16 NOTE — DISCHARGE INSTRUCTIONS
POSTOPERTIVE INSTRUCTIONS:      ACTIVITIES   For the next 24 hours, do not drive or operate dangerous machinery or  power tools, drink alcoholic beverages, make any important personal or  business decisions, or sign any legal documents.    Do NOT engage in sports, heavy work or lifting for two weeks following  Surgery.  Do not lift over 10 pounds for the next two weeks.  Do not drive a vehicle if still requiring narcotic pain medication    DIET   Advance your diet as tolerated to a regular diet.  Supplement with Ensure shakes.    SPECIAL CARE NEEDED   see tracheostomy and feeding tube print outs.  Keep inner cannula clean and change as needed if soiled  Do not allow water to get in your tracheostomy    MEDICATIONS  Resume your normal medications as prescribed by your primary doctor.     Pain medication: _____oxycodone______.   DO NOT DRIVE, OPERATE DANGEROUS MACHINERY, OR DRINK  ALCOHOL WHILE TAKING NARCOTIC PAIN MEDICATION.        WHEN TO CALL YOUR DOCTOR   A temperature greater than 100 F or 38 C.   Severe swelling over the neck.    If tracheostomy seems malpositioned or comes out.   Extreme drowsiness after the first day.   Inability to urinate 8 hours after surgery.   Vomiting lasting more than 4 hours.   Any other symptoms which concern you.    If you have any questions or concerns call my clinic at [phone].  Call [phone] if you have chest pain or shortness of breath or if the tracheostomy comes out.    FOLLOW UP:  You will need to arrange a follow up appointment with me for approximately 7-10 days from now to check your tracheostomy and be sure all is well.  Please bring tracheostomy equipment each time you come (do not need to be material for suction).  Will need to change trach out if the office every 3 months.    Dae Boswell MD  [facility]  [address], [address]  [place] [address]   (o) [phone]  (f) [phone]

## 2025-05-16 NOTE — H&P
Radiology History and Physical    Patient: Erik Sanchez 45 y.o. male       Chief Complaint: No chief complaint on file.      History of Present Illness: 45 year old male with cancer of head/neck, tracheostomy, presents for placement of percutaneous gastrostomy tube.    History:    Past Medical History:   Diagnosis Date    Bipolar II disorder, hypomanic episode, with seasonal pattern (HCC)     dx 1 year ago    GERD (gastroesophageal reflux disease) 2016    No longer an issue    Hypothyroidism 2023    Substance abuse (HCC)     Sober since January 2022     Family History   Problem Relation Age of Onset    Hypertension Mother     Alcohol Abuse Mother     Depression Mother     High Blood Pressure Mother     High Cholesterol Mother     Hypertension Father     High Blood Pressure Father     High Cholesterol Father     Thyroid Disease Sister     No Known Problems Sister     Alcohol Abuse Maternal Aunt     Alcohol Abuse Paternal Uncle     Substance Abuse Paternal Uncle     Alcohol Abuse Maternal Grandfather     Alcohol Abuse Maternal Cousin     Diabetes Paternal Cousin     Substance Abuse Paternal Cousin     Anesth Problems Neg Hx      Social History     Socioeconomic History    Marital status:      Spouse name: Not on file    Number of children: Not on file    Years of education: Not on file    Highest education level: Not on file   Occupational History    Not on file   Tobacco Use    Smoking status: Never     Passive exposure: Past    Smokeless tobacco: Former     Quit date: 1/8/2003   Vaping Use    Vaping status: Never Used   Substance and Sexual Activity    Alcohol use: Not Currently    Drug use: Not Currently    Sexual activity: Yes     Partners: Female     Birth control/protection: None     Comment: Only partner is wife, neg. STD test before marriage.   Other Topics Concern    Not on file   Social History Narrative    Not on file     Social Drivers of Health     Financial Resource Strain: Low Risk  (11/4/2024)  bisacodyl (DULCOLAX) suppository 10 mg  10 mg Rectal Daily PRN        Physical Exam:  There were no vitals taken for this visit.  GENERAL: alert, cooperative, no distress, appears stated age,   LUNG: Nonlabored respiration on room air  HEART: regular rate and rhythm    ASA 3  Mallampati trach    Alerts:      Laboratory:      Recent Labs     05/16/25  0212   HGB 11.9*   HCT 35.8*   WBC 6.6   *   BUN 12   K 3.7         Plan of Care/Planned Procedure:  Risks, benefits, and alternatives reviewed with patient and he agrees to proceed with the procedure.     Percutaneous gastrostomy tube    Deemed appropriate for moderate sedation with versed and fentanyl.    Sanjiv Jamison MD  Interventional Radiology  [facility]  10:43 AM, 5/16/2025

## 2025-05-16 NOTE — PROGRESS NOTES
TRANSFER - IN REPORT:    Verbal report received from DARIO Monique on Erik Sanchez  being received from ICU for ordered procedure      Report consisted of patient's Situation, Background, Assessment and   Recommendations(SBAR).     Information from the following report(s) Nurse Handoff Report and Surgery Report was reviewed with the receiving nurse.    Opportunity for questions and clarification was provided.      Assessment completed upon patient's arrival to unit and care assumed.

## 2025-05-16 NOTE — PLAN OF CARE
Speech LAnguage Pathology TREATMENT    Patient: Erik Sanchez (45 y.o. male)  Date: 5/16/2025  Primary Diagnosis: Cancer of tonsil ([facility]) [C09.9]  Squamous cell carcinoma of left tonsil ([facility]) [C09.9]  Procedure(s) (LRB):  TRACHEOSTOMY, LARYNGOSCOPY AND BIOPSY OF LEFT TONSIL MASS (Left) 2 Days Post-Op   Precautions: Trach, Aspiration    ASSESSMENT:  Patient introduced to pharyngeal strengthening exercises and EMST-75. Patient NPO for G-tube placement today; formal PO trials deferred. He is s/p trach exchange to Shiley 6 cuffless currently on trach collar. Patient tolerating PMV with excellent vocal quality and adequate SPO2, RR, HR during session. He reports PMV more comfortable after transition to cuffless trach. SLP will continue to closely follow for dysphagia management, swallowing rehab/maintenance exercises, trach/PMV, and EMST.     Patient will benefit from skilled intervention to address the above impairments.     PLAN :  Recommendations and Planned Interventions:  Diet: Regular and thin liquids for comfort with patient to self-select softer solids  -Pending G-tube placement for supplemental nutrition + medications  -PMV donned with all PO and during all waking hours as tolerated  -Rigorous oral care via standard bristle toothbrush    Recommend next SLP session: Pharyngeal strengthening/maintenance exercises, EMST-75, dysphagia education and re-assessment, trach/PMV education    Acute SLP Services: Yes, SLP will continue to follow per plan of care.  Discharge Recommendations: Yes, recommend SLP treatment at next level of care     SUBJECTIVE:   Patient stated, \"My swallowing feels better than it has a long time\" re: dinner tray tolerance.    OBJECTIVE:     Past Medical History:   Diagnosis Date    Bipolar II disorder, hypomanic episode, with seasonal pattern ([facility])     dx 1 year ago    GERD (gastroesophageal reflux disease) 2016    No longer an issue    Hypothyroidism 2023    Substance abuse ([facility])     Sober

## 2025-05-16 NOTE — PROGRESS NOTES
5928 Trach care completed by RN. Patient watched with mirror. Basic trach care teaching provided. Patient encouraged to participate hands on at subsequent trach care times.   Trach collar removed, O2 sats stable.

## 2025-05-16 NOTE — CARE COORDINATION
Transition of Care Plan:    RUR: 7%   Prior Level of Functioning: Independent   Disposition: Home with HH  SRINI:   Follow up appointments: PCP, ENT  DME needed: Trach supplies   Transportation at discharge: Family   IM/IMM Medicare/ letter given: NA  Is patient a [place] and connected with [facility]? No  Caregiver Contact: Wife Tatum Sanchez   Discharge Caregiver contacted prior to discharge? Patient to contact wife   Care Conference needed? No  Barriers to discharge:    Medical stability  Referral sent to [facility] and has been accepted.   Per nursing patient's trach was changed to cuffless.   Peg tube placed today.   Trach supplies ordered through [employer].   Per ENT patient is able to eat. Peg tube was placed as a precaution in case patient needs feedings once radiation starts.    Mary Ann Healy RN,Care Management

## 2025-05-17 VITALS
DIASTOLIC BLOOD PRESSURE: 89 MMHG | OXYGEN SATURATION: 95 % | WEIGHT: 158.95 LBS | TEMPERATURE: 98.4 F | BODY MASS INDEX: 21.07 KG/M2 | HEART RATE: 88 BPM | RESPIRATION RATE: 21 BRPM | SYSTOLIC BLOOD PRESSURE: 129 MMHG | HEIGHT: 73 IN

## 2025-05-17 LAB
ANION GAP SERPL CALC-SCNC: 4 MMOL/L (ref 2–12)
BUN SERPL-MCNC: 14 MG/DL (ref 6–20)
BUN/CREAT SERPL: 30 (ref 12–20)
CALCIUM SERPL-MCNC: 7.6 MG/DL (ref 8.5–10.1)
CHLORIDE SERPL-SCNC: 108 MMOL/L (ref 97–108)
CO2 SERPL-SCNC: 27 MMOL/L (ref 21–32)
CREAT SERPL-MCNC: 0.46 MG/DL (ref 0.7–1.3)
ERYTHROCYTE [DISTWIDTH] IN BLOOD BY AUTOMATED COUNT: 12.5 % (ref 11.5–14.5)
GLUCOSE SERPL-MCNC: 88 MG/DL (ref 65–100)
HCT VFR BLD AUTO: 32.7 % (ref 36.6–50.3)
HGB BLD-MCNC: 10.9 G/DL (ref 12.1–17)
MAGNESIUM SERPL-MCNC: 1.6 MG/DL (ref 1.6–2.4)
MCH RBC QN AUTO: 28.8 PG (ref 26–34)
MCHC RBC AUTO-ENTMCNC: 33.3 G/DL (ref 30–36.5)
MCV RBC AUTO: 86.3 FL (ref 80–99)
NRBC # BLD: 0 K/UL (ref 0–0.01)
NRBC BLD-RTO: 0 PER 100 WBC
PHOSPHATE SERPL-MCNC: 2.7 MG/DL (ref 2.6–4.7)
PLATELET # BLD AUTO: 129 K/UL (ref 150–400)
PMV BLD AUTO: 10 FL (ref 8.9–12.9)
POTASSIUM SERPL-SCNC: 3.3 MMOL/L (ref 3.5–5.1)
RBC # BLD AUTO: 3.79 M/UL (ref 4.1–5.7)
SODIUM SERPL-SCNC: 139 MMOL/L (ref 136–145)
WBC # BLD AUTO: 4.8 K/UL (ref 4.1–11.1)

## 2025-05-17 PROCEDURE — 80048 BASIC METABOLIC PNL TOTAL CA: CPT

## 2025-05-17 PROCEDURE — 6370000000 HC RX 637 (ALT 250 FOR IP): Performed by: OTOLARYNGOLOGY

## 2025-05-17 PROCEDURE — 94761 N-INVAS EAR/PLS OXIMETRY MLT: CPT

## 2025-05-17 PROCEDURE — 83735 ASSAY OF MAGNESIUM: CPT

## 2025-05-17 PROCEDURE — 84100 ASSAY OF PHOSPHORUS: CPT

## 2025-05-17 PROCEDURE — 85027 COMPLETE CBC AUTOMATED: CPT

## 2025-05-17 RX ADMIN — SENNOSIDES AND DOCUSATE SODIUM 1 TABLET: 50; 8.6 TABLET ORAL at 08:40

## 2025-05-17 RX ADMIN — OXYCODONE 5 MG: 5 TABLET ORAL at 13:17

## 2025-05-17 RX ADMIN — IBUPROFEN 600 MG: 400 TABLET, FILM COATED ORAL at 08:40

## 2025-05-17 RX ADMIN — LEVOTHYROXINE SODIUM 37.5 MCG: 0.07 TABLET ORAL at 08:40

## 2025-05-17 RX ADMIN — OXYCODONE 5 MG: 5 TABLET ORAL at 06:30

## 2025-05-17 ASSESSMENT — PAIN DESCRIPTION - DESCRIPTORS
DESCRIPTORS: ACHING
DESCRIPTORS: ACHING

## 2025-05-17 ASSESSMENT — PAIN SCALES - GENERAL
PAINLEVEL_OUTOF10: 7
PAINLEVEL_OUTOF10: 4

## 2025-05-17 ASSESSMENT — PAIN DESCRIPTION - ORIENTATION
ORIENTATION: LEFT;MID
ORIENTATION: LEFT

## 2025-05-17 ASSESSMENT — PAIN DESCRIPTION - LOCATION
LOCATION: ABDOMEN
LOCATION: ABDOMEN

## 2025-05-17 NOTE — PLAN OF CARE
Problem: Pain  Goal: Verbalizes/displays adequate comfort level or baseline comfort level  Outcome: Progressing  Flowsheets (Taken 5/16/2025 2000)  Verbalizes/displays adequate comfort level or baseline comfort level:   Encourage patient to monitor pain and request assistance   Assess pain using appropriate pain scale   Administer analgesics based on type and severity of pain and evaluate response   Implement non-pharmacological measures as appropriate and evaluate response   Notify Licensed Independent Practitioner if interventions unsuccessful or patient reports new pain   Consider cultural and social influences on pain and pain management     Problem: Discharge Planning  Goal: Discharge to home or other facility with appropriate resources  Outcome: Progressing  Flowsheets (Taken 5/16/2025 2000)  Discharge to home or other facility with appropriate resources:   Identify barriers to discharge with patient and caregiver   Arrange for needed discharge resources and transportation as appropriate   Identify discharge learning needs (meds, wound care, etc)   Arrange for interpreters to assist at discharge as needed   Refer to discharge planning if patient needs post-hospital services based on physician order or complex needs related to functional status, cognitive ability or social support system     Problem: Safety - Adult  Goal: Free from fall injury  Outcome: Progressing     Problem: Nutrition Deficit:  Goal: Optimize nutritional status  Outcome: Progressing

## 2025-05-17 NOTE — PROGRESS NOTES
2100- Completed 3 laps around the unit and tolerated well.    0700- Patient talked through trach care and completed it independently.

## 2025-05-17 NOTE — DISCHARGE SUMMARY
Otolaryngology - Head and Neck Surgery  Discharge Summary     Name: Erik Sanchez  MRN: [id number]  YOB: 1980    Admission Date: 5/14/2025   Discharge Date: No discharge date for patient encounter.     Physician: Dae Boswell MD   Referring Physician: No ref. provider found   Primary Care Provider: Sada Tran MD     Admission Diagnosis:     Cancer of tonsil (HCC) [C09.9]  Squamous cell carcinoma of left tonsil (HCC) [C09.9]       Discharge Diagnosis:     Cancer of tonsil (HCC) [C09.9]  Squamous cell carcinoma of left tonsil (HCC) [C09.9]       Secondary Diagnoses:     Patient Active Problem List   Diagnosis    History of alcohol use    Bipolar depression (HCC)    Tonsillar cancer (HCC)    Severe protein-calorie malnutrition     Past Medical History:   Diagnosis Date    Bipolar II disorder, hypomanic episode, with seasonal pattern ([facility])     dx 1 year ago    GERD (gastroesophageal reflux disease) 2016    No longer an issue    Hypothyroidism 2023    Substance abuse (HCC)     Sober since January 2022       Procedures Performed:     General Endotracheal Anesthesia  Tracheostomy  G tube  DL biopsy left tonsil  Central line placement  Conscious Sedation    Reason for Admission:     Erik Sanchez is a 45 y.o. male with [id number] SCCa left tonsil presenting for tracheostomy and biopsy.    Hospital Course:     Erik Sanchez is a 45 y.o. male was admitted on 5/14/2025 to undergo the above procedures. For complete details please see the operative note dictated on 5/14/2025. The procedure was tolerated well and he was extubated and transferred to the ICU for routine postoperative care.    While in house, medical and radiation oncology consulted for him and recommended chemoXRT.  Had CT chest that was clear other than liver lesions.  MRI abdomen showed these to be hemangiomas.  Pathology returned as moderately differentiated squamous cell carcinoma p16 negative.  A port and G tube were placed.  He

## 2025-05-17 NOTE — PROGRESS NOTES
Erik Sanchez is a 45 y.o. male POD3 from tracheostomy and DL/bx for [id number] SCCa left tonsil.     Demonstrated good self care with trach yesterday.  Got G tube w/o complication yesterday but maintaining nutrition via oral intake for now.  Pain controlled.  Wants to get care at [facility] for chemoXRT.    BP (!) 115/90   Pulse 75   Temp 98.4 °F (36.9 °C) (Oral)   Resp 11   Ht 1.854 m (6' 0.99\")   Wt 72.1 kg (158 lb 15.2 oz)   SpO2 97%   BMI 20.98 kg/m²    NAD  Trach well seated     Data review:  CT chest: chest clear, concerning lesion in liver  MRI abdomen: hemangiomas.  Repeat MRI in 6 mos.  MBS/SLP eval: safe to continue oral diet, rec G tube as likely will worsen during treatment  Dietician note: supplement with ensure  Med onc note: plan for outpatient PET.  Has received central line  Rad onc note: plan for XRT, consider [facility] surgical referral    Lab Results   Component Value Date    WBC 4.8 05/17/2025    HGB 10.9 (L) 05/17/2025    HCT 32.7 (L) 05/17/2025    MCV 86.3 05/17/2025     (L) 05/17/2025          A/P:  Doing well on POD3.    -Continue routine trach care per nursing staff and RT.  He will go home with this trach today or tomorrow pending home equipment delivery.  Discussed with CM yesterday.  Orders signed.  - ChemoXRT at [facility].  Discussed at length with patient and his wife Tatum yesterday.  Rec establishing on nonurgent basis with [facility] ENT for surveillance after completion.  -RTC 1-2 weeks to check trach and be sure all appropriate clinic f/u is taken care of.  -d/c home once equipment arrives today or tomorrow.      Dae Boswell MD  [facility]   [address] [place] [address], [address]  [place] [address]   (c) [phone]  (o) [phone]  (f) [phone]

## 2025-05-17 NOTE — CARE COORDINATION
CM received a call from [place] with [employer]. She said that the ETA for delivery of his DME is between 2:30pm and 5:30pm. Pt's wife and nurse notified. MYLES Packer,ACM-SW

## 2025-05-17 NOTE — CARE COORDINATION
RIDDHI- D/c to home with [employer] and DME from [facility].    CM checked on the referral to [facility] via [place] earlier this morning.The information showed that the referral for Oral suction package and Trache suction package was still processing.    CM called [facility] ([phone]). Cm was told that they have been trying to get in touch with this pt, but he is not answering the phone. CM gave them the wife's number to call instead. CM was also told that the trache care kit, cath suction 8 [place] and trache tube tobias would be shipped to the pt's home within a couple of business days.    CM spoke with pt's wife and she said that she did speak with a team member with [facility] and was told that they will not deliver the supplies until he is discharged and home. She asked that CM reach out to [facility] and have them deliver the supplies to the hospital.     Cm did call [facility] ([phone]) again and requested this. CM spoke with pt's nurse, Isabel to inform her of this.CM also asked her if she can supply the items (that will be shipped to the home) for a couple of days. She indicated that this would not be a problem.    Just now, CM called [employer] ([phone]) and spoke with [place] to find out what the ETA is for this delivery. She stated that she will have a dispatcher reach out to the  to find out and she will call this CM with the ETA. CM will follow. Anali Noland,BSW,ACM-SW

## 2025-05-18 DIAGNOSIS — E03.8 SUBCLINICAL HYPOTHYROIDISM: ICD-10-CM

## 2025-05-19 RX ORDER — LEVOTHYROXINE SODIUM 25 UG/1
TABLET ORAL
Qty: 135 TABLET | Refills: 0 | OUTPATIENT
Start: 2025-05-19

## 2025-05-29 ENCOUNTER — HOSPITAL ENCOUNTER (OUTPATIENT)
Facility: HOSPITAL | Age: 45
Discharge: HOME | End: 2025-05-29
Attending: RADIOLOGY
Payer: COMMERCIAL

## 2025-05-29 VITALS — BODY MASS INDEX: 20.72 KG/M2 | WEIGHT: 157 LBS

## 2025-05-29 DIAGNOSIS — C09.9 SQUAMOUS CELL CARCINOMA OF LEFT TONSIL (HCC): ICD-10-CM

## 2025-05-29 LAB
GLUCOSE BLD STRIP.AUTO-MCNC: 77 MG/DL (ref 65–117)
SERVICE CMNT-IMP: NORMAL

## 2025-05-29 PROCEDURE — 3430000000 HC RX DIAGNOSTIC RADIOPHARMACEUTICAL: Performed by: RADIOLOGY

## 2025-05-29 PROCEDURE — A9609 HC RX DIAGNOSTIC RADIOPHARMACEUTICAL: HCPCS | Performed by: RADIOLOGY

## 2025-05-29 PROCEDURE — 78815 PET IMAGE W/CT SKULL-THIGH: CPT

## 2025-05-29 PROCEDURE — 82962 GLUCOSE BLOOD TEST: CPT

## 2025-05-29 RX ORDER — FLUDEOXYGLUCOSE F-18 500 MCI/ML
10 INJECTION INTRAVENOUS
Status: COMPLETED | OUTPATIENT
Start: 2025-05-29 | End: 2025-05-29

## 2025-05-29 RX ADMIN — FLUDEOXYGLUCOSE F-18 10 MILLICURIE: 500 INJECTION INTRAVENOUS at 08:57

## 2025-08-04 ENCOUNTER — OFFICE VISIT (OUTPATIENT)
Age: 45
End: 2025-08-04
Payer: COMMERCIAL

## 2025-08-04 VITALS
TEMPERATURE: 98.2 F | WEIGHT: 158 LBS | OXYGEN SATURATION: 98 % | HEIGHT: 73 IN | HEART RATE: 78 BPM | BODY MASS INDEX: 20.94 KG/M2 | SYSTOLIC BLOOD PRESSURE: 101 MMHG | RESPIRATION RATE: 16 BRPM | DIASTOLIC BLOOD PRESSURE: 68 MMHG

## 2025-08-04 DIAGNOSIS — E03.8 SUBCLINICAL HYPOTHYROIDISM: ICD-10-CM

## 2025-08-04 DIAGNOSIS — C09.9 TONSILLAR CANCER (HCC): Primary | ICD-10-CM

## 2025-08-04 PROCEDURE — 99213 OFFICE O/P EST LOW 20 MIN: CPT | Performed by: INTERNAL MEDICINE

## 2025-08-04 RX ORDER — ONDANSETRON 8 MG/1
8 TABLET, FILM COATED ORAL EVERY 8 HOURS PRN
COMMUNITY
Start: 2025-05-28

## 2025-08-04 RX ORDER — PROCHLORPERAZINE MALEATE 10 MG
10 TABLET ORAL EVERY 6 HOURS PRN
COMMUNITY
Start: 2025-05-28

## 2025-08-04 RX ORDER — LIDOCAINE HYDROCHLORIDE 20 MG/ML
5 SOLUTION OROPHARYNGEAL
COMMUNITY
Start: 2025-07-17

## 2025-08-04 RX ORDER — LOPERAMIDE HYDROCHLORIDE 2 MG/1
2 CAPSULE ORAL PRN
COMMUNITY
Start: 2025-06-07

## 2025-08-04 RX ORDER — CETIRIZINE HYDROCHLORIDE 10 MG/1
10 TABLET ORAL DAILY
COMMUNITY
Start: 2025-07-25

## 2025-08-04 RX ORDER — SENNOSIDES 8.6 MG
1 TABLET ORAL DAILY
COMMUNITY
Start: 2025-05-16

## 2025-08-04 RX ORDER — SODIUM FLUORIDE 1.1 G/100G
GEL ORAL
COMMUNITY
Start: 2025-07-03

## 2025-08-04 RX ORDER — OLANZAPINE 5 MG/1
5 TABLET, FILM COATED ORAL
COMMUNITY
Start: 2025-07-03

## 2025-08-04 RX ORDER — OXYCODONE HYDROCHLORIDE 5 MG/1
5 CAPSULE ORAL EVERY 4 HOURS PRN
COMMUNITY

## 2025-08-04 RX ORDER — LIDOCAINE AND PRILOCAINE 25; 25 MG/G; MG/G
CREAM TOPICAL PRN
COMMUNITY
Start: 2025-05-28

## 2025-08-04 ASSESSMENT — ENCOUNTER SYMPTOMS
SHORTNESS OF BREATH: 0
COUGH: 0

## 2025-08-21 DIAGNOSIS — E03.8 SUBCLINICAL HYPOTHYROIDISM: ICD-10-CM

## 2025-08-21 RX ORDER — LEVOTHYROXINE SODIUM 25 UG/1
37.5 TABLET ORAL DAILY
Qty: 135 TABLET | Refills: 0 | Status: SHIPPED | OUTPATIENT
Start: 2025-08-21 | End: 2025-11-19

## (undated) DEVICE — SUTURE PERMA-HAND SZ 2-0 L30IN NONABSORBABLE BLK L26MM SH K833H

## (undated) DEVICE — NEEDLE HYPO 25GA L1.5IN BLU POLYPR HUB S STL REG BVL STR

## (undated) DEVICE — TOWEL,OR,DSP,ST,BLUE,STD,4/PK,20PK/CS: Brand: MEDLINE

## (undated) DEVICE — X-RAY DETECTABLE SPONGES,16 PLY: Brand: VISTEC

## (undated) DEVICE — SYRINGE MED 10ML TRNSLUC BRL PLUNG BLK MRK POLYPR CTRL

## (undated) DEVICE — TUBE TRACH AD SZ 6 L77MM INNR CANN ID65MM OUTER CANN

## (undated) DEVICE — SOLUTION IRRIG 1000ML 09% SOD CHL USP PIC PLAS CONTAINER

## (undated) DEVICE — GLOVE ORANGE PI 7   MSG9070

## (undated) DEVICE — CORD ES L12FT BPLR FRCP

## (undated) DEVICE — SPONGE LAP W18XL18IN WHT COT 4 PLY FLD STRUNG RADPQ DISP ST 2 PER PACK

## (undated) DEVICE — ENT-SMH: Brand: MEDLINE INDUSTRIES, INC.

## (undated) DEVICE — PENCIL SMK EVAC ALL IN 1 DSGN ENH VISIBILITY IMPROVED AIR

## (undated) DEVICE — SKIN PREP TRAY 4 COMPARTM TRAY: Brand: MEDLINE INDUSTRIES, INC.

## (undated) DEVICE — SPONGE,NEURO,0.5"X3",XR,STRL,LF,10/PK: Brand: MEDLINE

## (undated) DEVICE — GARMENT,MEDLINE,DVT,INT,CALF,MED, GEN2: Brand: MEDLINE

## (undated) DEVICE — HOLDER TRACH TB W1IN FIT UPTO 19.5IN NK 2 PC ADJ BLU

## (undated) DEVICE — BLADE ES ELASTOMERIC COAT INSUL DURABLE BEND UPTO 90DEG

## (undated) DEVICE — INTENT OT USE PROVIDES A STERILE INTERFACE BETWEEN THE OPERATING ROOM SURGICAL LAMPS (NON-STERILE) AND THE SURGEON OR STAFF WORKING IN THE STERILE FIELD.: Brand: ASPEN® ALC PLUS LIGHT HANDLE COVER

## (undated) DEVICE — SUTURE CHROMIC GUT SZ 2-0 L27IN ABSRB BRN L26MM SH 1/2 CIR G123H

## (undated) DEVICE — BASIN ST MAJOR-NO CAUTERY: Brand: MEDLINE INDUSTRIES, INC.

## (undated) DEVICE — ELECTRODE PT RET AD L9FT HI MOIST COND ADH HYDRGEL CORDED

## (undated) DEVICE — SPONGE,PEANUT,XRAY,ST,SM,3/8",5/CARD: Brand: MEDLINE INDUSTRIES, INC.